# Patient Record
Sex: MALE | Race: WHITE
[De-identification: names, ages, dates, MRNs, and addresses within clinical notes are randomized per-mention and may not be internally consistent; named-entity substitution may affect disease eponyms.]

---

## 2017-05-07 ENCOUNTER — HOSPITAL ENCOUNTER (EMERGENCY)
Dept: HOSPITAL 25 - ED | Age: 42
Discharge: HOME | End: 2017-05-07
Payer: SELF-PAY

## 2017-05-07 VITALS — DIASTOLIC BLOOD PRESSURE: 73 MMHG | SYSTOLIC BLOOD PRESSURE: 120 MMHG
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
## 2017-05-07 DIAGNOSIS — S61.214A: Primary | ICD-10-CM
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
## 2017-05-07 PROCEDURE — 99282 EMERGENCY DEPT VISIT SF MDM: CPT

## 2017-05-07 NOTE — ED
Laceration/Wound HPI





- HPI Summary


HPI Summary: 





Patient is an otherwise healthy 40 YO BIBA with a laceration of finger. Patient 

was washing a wall down at work ([employer]) when he caught his right 4th 

finger on the corner of a piece of metal.  He denies other injuries or pain.  

He denies numbness, tingling, or color or temperature changes. Nail bed is 

intact. 





- History of Current Complaint


Stated Complaint: FINGER LAC


Time Seen by Provider: 05/07/17 20:55


Hx Obtained From: Patient


Mechanism of Injury: Sharp/Blunt Trauma


Onset/Duration: Sudden Onset


Aggravating: Nothing


Alleviating: Compression


Timing: Constant


Onset Severity: Moderate


Current Severity: Severe


Pain Intensity: 5


Pain Scale Used: 0-10 Numeric


Associated Signs & Symptoms: Negative


Related Hx: Occupational Injury, Dominant Hand (Right)





- Allergy/Home Medications


Allergies/Adverse Reactions: 


 Allergies











Allergy/AdvReac Type Severity Reaction Status Date / Time


 


No Known Allergies Allergy   Verified 05/07/17 21:55














PMH/Surg Hx/FS Hx/Imm Hx


Previously Healthy: Yes





- Immunization History


Hx Pertussis Vaccination: No


Immunizations Up to Date: Yes


Infectious Disease History: Yes


Infectious Disease History: 


   Denies: Traveled Outside the [place] in Last 30 Days





- Social History


Occupation: Employed Full-time


Lives: With Family


Alcohol Use: None


Hx Substance Use: No


Substance Use Type: Reports: None


Hx Tobacco Use: No


Smoking Status (MU): Never Smoked Tobacco


Do You Chew or Dip Tobacco: No


Have You Chewed or Dipped Tobacco in the LAST YEAR: No





Review of Systems


Constitutional: Negative


Eyes: Negative


Cardiovascular: Negative


Positive: Shortness Of Breath


Positive: no symptoms reported, see HPI


Positive: Other - .5cm avulsion to the fourth finger


Neurological: Negative


Psychological: Normal


All Other Systems Reviewed And Are Negative: Yes





Physical Exam


Triage Information Reviewed: Yes


Vital Signs On Initial Exam: 


 Initial Vitals











Temp Pulse Resp BP Pulse Ox


 


 99.3 F   70   20   120/73   98 


 


 05/07/17 19:42  05/07/17 19:42  05/07/17 19:42  05/07/17 19:42  05/07/17 19:42











Vital Signs Reviewed: Yes


Appearance: Positive: Well-Appearing, Well-Nourished


Skin: Positive: Warm, Skin Color Reflects Adequate Perfusion, Other - fourth 

right finger avulsion measuring .5cm in length


Head/Face: Positive: Normal Head/Face Inspection


Eyes: Positive: LORENA, Conjunctiva Clear


Neck: Positive: Supple, No Lymphadenopathy


Respiratory/Lung Sounds: Positive: Clear to Auscultation, Breath Sounds Present


Cardiovascular: Positive: RRR, Pulses are Symmetrical in both Upper and Lower 

Extremities


Musculoskeletal: Positive: Normal, Strength/ROM Intact


Neurological: Positive: Sensory/Motor Intact, Speech Normal


Psychiatric: Positive: Normal





Diagnostics





- Vital Signs


 Vital Signs











  Temp Pulse Resp BP Pulse Ox


 


 05/07/17 19:42  99.3 F  70  20  120/73  98














- Laboratory


Lab Statement: Any lab studies that have been ordered have been reviewed, and 

results considered in the medical decision making process.





Laceration Repair Course/Dx





- Course


Course Of Treatment: Timeout obtained for laceration repair with sutures.  

Patient not tolerating lidocaine well.  Attempted to provide sutures with the 

amount of lidocaine which was already placed and patient not tolerating 

procedure.  The decision was made between provider and patient, we would use 

steri strips and use a compression dressing for 5 days over the wound to keep 

it covered with the skin intact.  Patient will follow up with PCP.  Tetanus UTD.





- Differential Dx


Differental Diagnoses: Avulsion, Healing Wound, Laceration, Tenosynovitis





- Clinical Impression


Provider Diagnoses: 


 Avulsion, finger tip








Discharge





- Discharge Plan


Condition: Stable


Disposition: HOME


Patient Education Materials:  Steristrips (ED)


Referrals: 


No Primary Care Phys,NOPCP [Primary Care Provider] - 


Additional Instructions: 


Continue with steri strips and compression dressing for at least 5 days.  


If you notice any signs of infection, come back to ED

## 2018-05-07 ENCOUNTER — HOSPITAL ENCOUNTER (EMERGENCY)
Dept: HOSPITAL 25 - UCEAST | Age: 43
Discharge: HOME | End: 2018-05-07
Payer: SELF-PAY

## 2018-05-07 VITALS — SYSTOLIC BLOOD PRESSURE: 125 MMHG | DIASTOLIC BLOOD PRESSURE: 91 MMHG
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
## 2018-05-07 DIAGNOSIS — S06.0X0A: Primary | ICD-10-CM

## 2018-05-07 DIAGNOSIS — Z87.891: ICD-10-CM
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
## 2018-05-07 DIAGNOSIS — S01.81XA: ICD-10-CM

## 2018-05-07 PROCEDURE — 12011 RPR F/E/E/N/L/M 2.5 CM/<: CPT

## 2018-05-07 PROCEDURE — 70450 CT HEAD/BRAIN W/O DYE: CPT

## 2018-05-07 PROCEDURE — G0463 HOSPITAL OUTPT CLINIC VISIT: HCPCS

## 2018-05-07 PROCEDURE — 12001 RPR S/N/AX/GEN/TRNK 2.5CM/<: CPT

## 2018-05-07 PROCEDURE — 99202 OFFICE O/P NEW SF 15 MIN: CPT

## 2018-05-07 NOTE — RAD
INDICATION:  Laceration overlying the left frontal bone after traumatic injury



COMPARISON: None.



TECHNIQUE: Contiguous axial sections of the brain were obtained from the skull base to the

vertex without contrast.



FINDINGS: 



The ventricles, cisterns and sulci are within normal limits.  



    The gray-white matter differentiation is adequately maintained and there is no sulcal

effacement. No significant focal abnormality or mass effect is present. 



There is no evidence for intracranial hemorrhage.



There is a small amount of infiltration in the subcutaneous tissue overlying the left

frontal bone. Evidence of a small laceration is seen at this same location. There is no

underlying calvarial fracture.. 



There is mild mucosal thickening of the ethmoid air cells bilaterally.



The mastoid air cells are well aerated bilaterally.



IMPRESSION:  Mild infiltration in the subcutaneous tissue overlying the left frontal bone

without underlying calvarial fracture or acute intracranial hemorrhage.

## 2018-05-07 NOTE — UC
Head Injury HPI





- HPI Summary


HPI Summary: 





41 yo male presents soon after work related head injury


bumped head on dumpster


No LOC


bled profusely


nausea initially





HA 10/10


no photo phono phobia





no neck pain











- History Of Current Complaint


Chief Complaint: UCHeadInjury


Stated Complaint: HEAD LACERATION


Time Seen by Provider: 05/07/18 18:12


Hx Obtained From: Patient


Onset/Duration: Sudden Onset, Lasting Minutes


Severity Currently: Severe


Severity Initially: Severe


Pain Intensity: 10


Pain Scale Used: 0-10 Numeric


Character: Sharp, Throbbing, Pressure


Aggravating Factor(s): Nothing


Alleviating Factor(s): Nothing


Associated Signs And Symptoms: Positive: Nausea.  Negative: LOC (Time In Secs./

Mins/Hrs), LOC Duration Unknown, Confusion, Memory Loss, Seizure, Epistaxis, 

Dental Malocclusion, Neck Pain, Vomiting





- Allergies/Home Medications


Allergies/Adverse Reactions: 


 Allergies











Allergy/AdvReac Type Severity Reaction Status Date / Time


 


No Known Allergies Allergy   Verified 05/07/18 18:06














PMH/Surg Hx/FS Hx/Imm Hx


Previously Healthy: Yes





- Surgical History


Surgical History: Yes


Surgery Procedure, Year, and Place: appendix





- Family History


Known Family History: Positive: Hypertension





- Social History


Alcohol Use: None


Substance Use Type: None


Smoking Status (MU): Former Smoker





Review of Systems


Constitutional: Negative


Skin: Negative


Eyes: Negative


ENT: Negative


Respiratory: Negative


Cardiovascular: Negative


Gastrointestinal: Nausea - resolved


Genitourinary: Negative


Motor: Negative


Neurovascular: Negative


Musculoskeletal: Negative


Neurological: Headache


Psychological: Negative


Is Patient Immunocompromised?: No


All Other Systems Reviewed And Are Negative: Yes





Physical Exam


Triage Information Reviewed: Yes


Appearance: Well-Appearing, No Pain Distress, Well-Nourished


Vital Signs: 


 Initial Vital Signs











Temp  98.4 F   05/07/18 17:58


 


Pulse  66   05/07/18 17:58


 


Resp  18   05/07/18 17:58


 


BP  123/87   05/07/18 17:58


 


Pulse Ox  99   05/07/18 17:58











Vital Signs Reviewed: Yes


Eyes: Positive: Conjunctiva Clear, Other: - perrl/eomi


ENT: Positive: Hearing grossly normal, TMs normal, Uvula midline.  Negative: 

Nasal congestion, Nasal drainage, Tonsillar swelling, Tonsillar exudate, Trismus

, Muffled voice, Hoarse voice, Sinus tenderness


Neck: Positive: Supple, Nontender, No Lymphadenopathy


Respiratory: Positive: Lungs clear, Normal breath sounds, No respiratory 

distress, No accessory muscle use


Cardiovascular: Positive: RRR, No Murmur


Musculoskeletal: Positive: ROM Intact, No Edema


Neurological: Positive: Alert


Psychological Exam: Normal


Skin Exam: Other - see image





Procedures





- Laceration/Wound Repair


  ** 1


Location: head


Description: Linear


Length, Depth and Shape: 1.5 cm. ,3 mm, linear


Irrigated w/ Saline (ccs): 250


Laceration/Wound Explored: clean


Closure: Skin Adhesive, SteriStrips


Layer Closure?: No


Sterile Dressing Applied?: No





Diagnostics





- Radiology


  ** No standard instances


Xray Interpretation: No Acute Changes - CT brain


Radiology Interpretation Completed By: Radiologist





Head Injury Course/Dx





- Differential Dx/Diagnosis


Provider Diagnoses: concussion.  laceration forehead, skin adhesive repair





Discharge





- Sign-Out/Discharge


Documenting (check all that apply): Discharge/Admit/Transfer





- Discharge Plan


Condition: Stable


Disposition: HOME


Prescriptions: 


Ibuprofen TAB* [Motrin TAB*] 600 mg PO Q6H PRN #40 tab


 PRN Reason: Pain


Patient Education Materials:  Concussion (ED), Skin Adhesive Care (ED)


Forms:  *Work Release


Referrals: 


No Primary Care Phys,NOPCP [Primary Care Provider] - 


Additional Instructions: 


recheck here 5/10 for reevaluation








- Billing Disposition and Condition


Condition: STABLE


Disposition: HOME





Images


Head: 


  __________________________














  __________________________





 1 - 1.5 cm lac

## 2018-05-10 ENCOUNTER — HOSPITAL ENCOUNTER (EMERGENCY)
Dept: HOSPITAL 25 - UCEAST | Age: 43
Discharge: HOME | End: 2018-05-10
Payer: SELF-PAY

## 2018-05-10 VITALS — DIASTOLIC BLOOD PRESSURE: 77 MMHG | SYSTOLIC BLOOD PRESSURE: 114 MMHG

## 2018-05-10 DIAGNOSIS — R10.31: ICD-10-CM
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
## 2018-05-10 DIAGNOSIS — S09.90XD: Primary | ICD-10-CM

## 2018-05-10 DIAGNOSIS — Z87.891: ICD-10-CM

## 2018-05-10 PROCEDURE — 99211 OFF/OP EST MAY X REQ PHY/QHP: CPT

## 2018-05-10 PROCEDURE — G0463 HOSPITAL OUTPT CLINIC VISIT: HCPCS

## 2018-05-10 NOTE — ED
Head Injury





- HPI Summary


HPI Summary: 


42 male presents with head injury rechecked today.  He states on Monday he hit 

his head on a dumpster.  He denies any loss consciousness.  He has mild 

intermittent headache.  He denies any current headache.  He did have nausea but 

that resolved.  He denies any dizziness.  He denies any neck pain.  Denies any 

photophobia.  He denies any difficulties concentrating.  He denies any 

photophobia.  He denies any change in vision.  He denies any vomiting.  He 

states he feels he is ready to go back to work. 


He also run out that he has been having intermittent right lower quadrant pain 

for the past couple months.  He states he feels better after bowel movement.  

He states he has been burping a lot.  He is tried Gas-X with minimal relief.  

He states he has had normal appetite.  Denies any nausea vomiting.  Denies any 

fever.  He has had his appendix removed in the past.  He denies any pain with 

urination.  He denies any hematuria.








- History Of Current Complaint


Chief Complaint: UCHeadInjury


Stated Complaint: HEAD INJURY RECHECK


Time Seen by Provider: 05/10/18 10:43


Pain Intensity: 2





- Allergies/Home Medications


Allergies/Adverse Reactions: 


 Allergies











Allergy/AdvReac Type Severity Reaction Status Date / Time


 


No Known Allergies Allergy   Verified 05/10/18 10:38














PMH/Surg Hx/FS Hx/Imm Hx


Endocrine/Hematology History: 


   Denies: Hx Anticoagulant Therapy


Respiratory History: 


   Denies: Hx Asthma





- Surgical History


Surgery Procedure, Year, and Place: appendix


Infectious Disease History: No


Infectious Disease History: 


   Denies: Traveled Outside the [place] in Last 30 Days





- Family History


Known Family History: Positive: Hypertension





- Social History


Alcohol Use: None


Hx Substance Use: No


Substance Use Type: Reports: None


Hx Tobacco Use: No


Smoking Status (MU): Former Smoker





Review of Systems


Negative: Fever


Negative: Chest Pain


Negative: Shortness Of Breath


Positive: Abdominal Pain


Positive: Headache


All Other Systems Reviewed And Are Negative: Yes





Physical Exam


Triage Information Reviewed: Yes


Vital Signs On Initial Exam: 


 Initial Vitals











Temp Pulse Resp BP Pulse Ox


 


 97.9 F   70   16   114/77   98 


 


 05/10/18 10:38  05/10/18 10:38  05/10/18 10:38  05/10/18 10:38  05/10/18 10:38











Vital Signs Reviewed: Yes


Appearance: Positive: Well-Appearing


Skin: Positive: Warm, Dry


Head/Face: Positive: Normal Head/Face Inspection, Other - healing laceration to 

side of head left


Eyes: Positive: Normal, EOMI, LORENA, Conjunctiva Clear


ENT: Positive: Normal ENT inspection, Pharynx normal, TMs normal


Neck: Positive: Other: - nontender neck


Respiratory/Lung Sounds: Positive: Clear to Auscultation, Breath Sounds Present


Cardiovascular: Positive: Normal, RRR


Abdomen Description: Positive: Nontender, Soft


Bowel Sounds: Positive: Present


Musculoskeletal: Positive: Normal


Neurological: Positive: Sensory/Motor Intact, Alert, Oriented to Person Place, 

Time, CN Intact II-III, Normal Gait, Finger to Nose


Psychiatric: Positive: Normal





Diagnostics





- Vital Signs


 Vital Signs











  Temp Pulse Resp BP Pulse Ox


 


 05/10/18 10:38  97.9 F  70  16  114/77  98














- Laboratory


Lab Statement: Any lab studies that have been ordered have been reviewed, and 

results considered in the medical decision making process.





Head Injury Course/Dx


Course Of Treatment: 42 male presents with head injury rechecked today.  He 

states on Monday he hit his head on a dumpster.  He denies any loss 

consciousness.  He has mild intermittent headache.  He denies any current 

headache.  He did have nausea but that resolved.  He denies any dizziness.  He 

denies any neck pain.  Denies any photophobia.  He denies any difficulties 

concentrating.  He denies any photophobia.  He denies any change in vision.  He 

denies any vomiting.  He states he feels he is ready to go back to work.  He 

also run out that he has been having intermittent right lower quadrant pain for 

the past couple months.  He states he feels better after bowel movement.  He 

states he has been burping a lot.  He is tried Gas-X with minimal relief.  He 

states he has had normal appetite.  Denies any nausea vomiting.  Denies any 

fever.  He has had his appendix removed in the past.  He denies any pain with 

urination.  He denies any hematuria.  On exam normal neuro exam.  Nontender 

abdomen.  Will cleared for work with head injury.  With abdomen being nontender 

and not having any pain with urination do not suspect anything surgical or 

infectious at this time.  Told to try increase fiber as may be related to 

constipation.  told to try Pepcid.  told to establish care with primary to 

follow up about abdominal pain. told if anything changes to return to ED for 

further testing about abdominal pain.  Patient understands and agrees with plan.





- Diagnoses


Differential Diagnosis/HQI/PQRI: Concussion Without LOC, Contusion, 

Intracranial Bleed


Provider Diagnoses: 


 Head injury, Abdominal pain








Discharge





- Sign-Out/Discharge


Documenting (check all that apply): Discharge/Admit/Transfer





- Discharge Plan


Condition: Good


Disposition: HOME


Patient Education Materials:  Head Injury (ED)


Forms:  *Work Release


Referrals: 


[facility] [place] [facility]ERR[facility] [Outside]


Additional Instructions: 


Establish care with primary


Take Tylenol or ibuprofen every 6 hours for pain as needed


Return to ED if develop any new or worsening symptoms








- Billing Disposition and Condition


Condition: GOOD


Disposition: HOME

## 2018-08-05 ENCOUNTER — HOSPITAL ENCOUNTER (EMERGENCY)
Dept: HOSPITAL 25 - ED | Age: 43
Discharge: HOME | End: 2018-08-05
Payer: SELF-PAY

## 2018-08-05 VITALS — SYSTOLIC BLOOD PRESSURE: 113 MMHG | DIASTOLIC BLOOD PRESSURE: 80 MMHG

## 2018-08-05 DIAGNOSIS — R10.31: Primary | ICD-10-CM

## 2018-08-05 DIAGNOSIS — Z90.89: ICD-10-CM

## 2018-08-05 DIAGNOSIS — F17.200: ICD-10-CM

## 2018-08-05 LAB
BASOPHILS # BLD AUTO: 0.1 10^3/UL (ref 0–0.2)
EOSINOPHIL # BLD AUTO: 0.1 10^3/UL (ref 0–0.6)
HCT VFR BLD AUTO: 47 % (ref 42–52)
HGB BLD-MCNC: 16.5 G/DL (ref 14–18)
LYMPHOCYTES # BLD AUTO: 1.5 10^3/UL (ref 1–4.8)
MCH RBC QN AUTO: 31 PG (ref 27–31)
MCHC RBC AUTO-ENTMCNC: 35 G/DL (ref 31–36)
MCV RBC AUTO: 90 FL (ref 80–94)
MONOCYTES # BLD AUTO: 0.8 10^3/UL (ref 0–0.8)
NEUTROPHILS # BLD AUTO: 4.3 10^3/UL (ref 1.5–7.7)
NRBC # BLD AUTO: 0 10^3/UL
NRBC BLD QL AUTO: 0.1
PLATELET # BLD AUTO: 281 10^3/UL (ref 150–450)
RBC # BLD AUTO: 5.25 10^6/UL (ref 4–5.4)
WBC # BLD AUTO: 6.8 10^3/UL (ref 3.5–10.8)

## 2018-08-05 PROCEDURE — 74018 RADEX ABDOMEN 1 VIEW: CPT

## 2018-08-05 PROCEDURE — 83605 ASSAY OF LACTIC ACID: CPT

## 2018-08-05 PROCEDURE — 36415 COLL VENOUS BLD VENIPUNCTURE: CPT

## 2018-08-05 PROCEDURE — 99282 EMERGENCY DEPT VISIT SF MDM: CPT

## 2018-08-05 PROCEDURE — 85025 COMPLETE CBC W/AUTO DIFF WBC: CPT

## 2018-08-05 PROCEDURE — 81003 URINALYSIS AUTO W/O SCOPE: CPT

## 2018-08-05 PROCEDURE — 80053 COMPREHEN METABOLIC PANEL: CPT

## 2018-08-05 PROCEDURE — 86140 C-REACTIVE PROTEIN: CPT

## 2018-08-05 PROCEDURE — 83690 ASSAY OF LIPASE: CPT

## 2018-08-05 NOTE — ED
Abdominal Pain/Male





- HPI Summary


HPI Summary: 





This is ernie Salazar documenting for attending Arnoldo Camacho MD. 


This patient is a 43 year old M presenting to [facility] with a chief complaint of 

RLQ pain for about 1 month. Pt says he has a PSHx of appendectomy and that the 

pain is intermittent burning, and can get very extreme, and gets worse at work. 

Pt works as a  and does mild lifting. Pt sometimes burps a lot, and eats 

spicy foods often. Patient reports mild constipation, but drank a lot of coffee 

this morning to try and get himself to have a BM, and said he had a few and 

that the stool was not hard. Patient denies nausea or vomiting, hematuria, fever

, back pain, dysuria. Pt also denies smoking.








- History of Current Complaint


Chief Complaint: EDAbdPain


Stated Complaint: RT FLANK PAIN


Time Seen by Provider: 08/05/18 13:59


Hx Obtained From: Patient


Onset/Duration: Lasting Weeks, Still Present


Timing: Intermittent


Severity Initially: Moderate


Severity Currently: Moderate


Pain Intensity: 4 - says pain is intermittent


Pain Scale Used: 0-10 Numeric


Location: Discrete At: RLQ


Character: Burning


Aggravating Factor(s): Food


Associated Signs And Symptoms: Negative: Fever, Back Pain, Urinary Symptoms, 

Nausea, Vomiting





- Allergies/Home Medications


Allergies/Adverse Reactions: 


 Allergies











Allergy/AdvReac Type Severity Reaction Status Date / Time


 


No Known Allergies Allergy   Verified 08/05/18 12:38














PMH/Surg Hx/FS Hx/Imm Hx


Endocrine/Hematology History: 


   Denies: Hx Anticoagulant Therapy


Respiratory History: 


   Denies: Hx Asthma





- Surgical History


Surgery Procedure, Year, and Place: appendix


Infectious Disease History: No


Infectious Disease History: 


   Denies: Traveled Outside the [place] in Last 30 Days





- Family History


Known Family History: Positive: Hypertension


Family History: Mom is a smoker. Dad is healthy.





- Social History


Alcohol Use: Occasionally


Hx Substance Use: No


Substance Use Type: Reports: None


Hx Tobacco Use: No


Smoking Status (MU): Current Every Day Smoker





Review of Systems


Negative: Fever


Negative: Shortness Of Breath, Cough


Positive: Abdominal Pain - RLQ .  Negative: Vomiting, Nausea


Negative: dysuria, hematuria


Positive: Other - scar ABD RLQ


All Other Systems Reviewed And Are Negative: Yes





Physical Exam





- Summary


Physical Exam Summary: 


Appearance: Well appearing, no pain distress


Skin: surgical scar in RLQ 


Head/face: normal


Eyes: EOMI, LORENA


ENT: normal


Neck: supple, non-tender


Respiratory: CTA, breath sounds present


Cardiovascular: RRR, pulses symmetrical  


Abdomen: tenderness in the RLQ, soft, no palpable hernia 


Bowel: mild constipation


Musculoskeletal: normal, strength/ROM intact


Neuro: normal, sensory motor intact, A&Ox3





Triage Information Reviewed: Yes


Vital Signs On Initial Exam: 


 Initial Vitals











Temp Pulse Resp BP Pulse Ox


 


 98.7 F   69   15   117/75   98 


 


 08/05/18 12:36  08/05/18 12:36  08/05/18 12:36  08/05/18 12:36  08/05/18 12:36











Vital Signs Reviewed: Yes


Appearance: Positive: Well-Appearing, Pain Distress - mild


Skin: Positive: Warm, Skin Color Reflects Adequate Perfusion, Dry


Head/Face: Positive: Normal Head/Face Inspection


Respiratory/Lung Sounds: Positive: Clear to Auscultation, Breath Sounds Present


Cardiovascular: Positive: Normal, RRR, Pulses are Symmetrical in both Upper and 

Lower Extremities


Abdomen Description: Positive: Other: - RLQ tenderness


Bowel Sounds: Positive: Other - mild const


Musculoskeletal: Positive: Normal


Neurological: Positive: Normal, Sensory/Motor Intact, Alert, Oriented to Person 

Place, Time


Psychiatric: Positive: Normal





Diagnostics





- Vital Signs


 Vital Signs











  Temp Pulse Resp BP Pulse Ox


 


 08/05/18 12:36  98.7 F  69  15  117/75  98














- Laboratory


Lab Results: 


 Lab Results











  08/05/18 08/05/18 Range/Units





  14:01 14:08 


 


WBC  6.8   (3.5-10.8)  10^3/ul


 


RBC  5.25   (4.00-5.40)  10^6/ul


 


Hgb  16.5   (14.0-18.0)  g/dl


 


Hct  47   (42-52)  %


 


MCV  90   (80-94)  fL


 


MCH  31   (27-31)  pg


 


MCHC  35   (31-36)  g/dl


 


RDW  13   (10.5-15)  %


 


Plt Count  281   (150-450)  10^3/ul


 


MPV  7.7   (7.4-10.4)  um3


 


Neut % (Auto)  63.8   (38-83)  %


 


Lymph % (Auto)  22.7 L   (25-47)  %


 


Mono % (Auto)  11.2 H   (0-7)  %


 


Eos % (Auto)  1.2   (0-6)  %


 


Baso % (Auto)  1.1   (0-2)  %


 


Absolute Neuts (auto)  4.3   (1.5-7.7)  10^3/ul


 


Absolute Lymphs (auto)  1.5   (1.0-4.8)  10^3/ul


 


Absolute Monos (auto)  0.8   (0-0.8)  10^3/ul


 


Absolute Eos (auto)  0.1   (0-0.6)  10^3/ul


 


Absolute Basos (auto)  0.1   (0-0.2)  10^3/ul


 


Absolute Nucleated RBC  0   10^3/ul


 


Nucleated RBC %  0.1   


 


Urine Color   Yellow  


 


Urine Appearance   Clear  


 


Urine pH   6.0  (5-9)  


 


Ur Specific Gravity   1.006 L  (1.010-1.030)  


 


Urine Protein   Negative  (Negative)  


 


Urine Ketones   Negative  (Negative)  


 


Urine Blood   Negative  (Negative)  


 


Urine Nitrate   Negative  (Negative)  


 


Urine Bilirubin   Negative  (Negative)  


 


Urine Urobilinogen   Negative  (Negative)  


 


Ur Leukocyte Esterase   Negative  (Negative)  


 


Urine Glucose   Negative  (Negative)  











Result Diagrams: 


 08/05/18 14:01





 08/05/18 14:01


Lab Statement: Any lab studies that have been ordered have been reviewed, and 

results considered in the medical decision making process.





- Radiology


  ** Abd xray


Xray Interpretation: No Acute Changes


Radiology Interpretation Completed By: Radiologist - Unremarkable abdomen 

series. ED physician has reviewed this radiology report.





Abdominal Pain Fem Course/Dx





- Course


Course Of Treatment: Patient with ongoing intermittent abdominal pain for about 

one month.  His abdomen is soft and nontender.  There is no palpable hernia in 

the area of prior surgery.  X-ray shows positive bowel gas but no specific 

pathology.  Laboratories are all benign.  His possibly he's had symptoms due to 

constipation.  We will treat him presumptively for that.  He is given follow-up 

and should follow-up this week.





- Diagnoses


Differential Diagnosis/HQI/PQRI: Other - Inflammatory bowel disease, hernia, UTI

, constipation


Provider Diagnoses: 


 Right sided abdominal pain








Discharge





- Sign-Out/Discharge


Documenting (check all that apply): Patient Departure





- Discharge Plan


Condition: Improved


Disposition: HOME


Prescriptions: 


Hyoscyamine Sulfate [Levsin] 0.125 mg PO Q4H PRN #30 tablet


 PRN Reason: cramping


Polyethylene Glycol 3350* [Miralax*] 17 gm PO TID #1 bottle


Patient Education Materials:  Acute Abdominal Pain (ED)


Referrals: 


[facility] [Outside]


[facility] [place] [facility] [Outside]


Additional Instructions: 


Drink plenty of fluids, abdominal massage may help.  Stay active.  High-fiber 

diet.  Return if worse, fever, new symptoms or other concerns as discussed.





- Billing Disposition and Condition


Condition: IMPROVED


Disposition: Home

## 2019-06-19 ENCOUNTER — HOSPITAL ENCOUNTER (EMERGENCY)
Dept: HOSPITAL 25 - ED | Age: 44
Discharge: LEFT BEFORE DISCHARGE | End: 2019-06-19
Payer: COMMERCIAL

## 2019-06-19 VITALS — DIASTOLIC BLOOD PRESSURE: 83 MMHG | SYSTOLIC BLOOD PRESSURE: 140 MMHG

## 2019-06-19 DIAGNOSIS — M54.9: Primary | ICD-10-CM

## 2019-06-19 DIAGNOSIS — Z53.21: ICD-10-CM

## 2019-06-19 LAB
RBC UR QL AUTO: (no result)
WBC UR QL AUTO: (no result)

## 2019-06-19 PROCEDURE — 87086 URINE CULTURE/COLONY COUNT: CPT

## 2019-06-19 PROCEDURE — 81015 MICROSCOPIC EXAM OF URINE: CPT

## 2019-06-19 PROCEDURE — 81003 URINALYSIS AUTO W/O SCOPE: CPT

## 2019-06-22 ENCOUNTER — HOSPITAL ENCOUNTER (EMERGENCY)
Dept: HOSPITAL 25 - ED | Age: 44
Discharge: HOME | End: 2019-06-22
Payer: COMMERCIAL

## 2019-06-22 VITALS — SYSTOLIC BLOOD PRESSURE: 120 MMHG | DIASTOLIC BLOOD PRESSURE: 84 MMHG

## 2019-06-22 DIAGNOSIS — N20.9: Primary | ICD-10-CM

## 2019-06-22 DIAGNOSIS — F17.210: ICD-10-CM

## 2019-06-22 DIAGNOSIS — R91.1: ICD-10-CM

## 2019-06-22 LAB
ALBUMIN SERPL BCG-MCNC: 4.3 G/DL (ref 3.2–5.2)
ALBUMIN/GLOB SERPL: 1.8 {RATIO} (ref 1–3)
ALP SERPL-CCNC: 70 U/L (ref 34–104)
ALT SERPL W P-5'-P-CCNC: 65 U/L (ref 7–52)
ANION GAP SERPL CALC-SCNC: 6 MMOL/L (ref 2–11)
AST SERPL-CCNC: 30 U/L (ref 13–39)
BASOPHILS # BLD AUTO: 0 10^3/UL (ref 0–0.2)
BUN SERPL-MCNC: 22 MG/DL (ref 6–24)
BUN/CREAT SERPL: 20.2 (ref 8–20)
CALCIUM SERPL-MCNC: 9.2 MG/DL (ref 8.6–10.3)
CHLORIDE SERPL-SCNC: 105 MMOL/L (ref 101–111)
EOSINOPHIL # BLD AUTO: 0.2 10^3/UL (ref 0–0.6)
GLOBULIN SER CALC-MCNC: 2.4 G/DL (ref 2–4)
GLUCOSE SERPL-MCNC: 111 MG/DL (ref 70–100)
HCO3 SERPL-SCNC: 27 MMOL/L (ref 22–32)
HCT VFR BLD AUTO: 43 % (ref 42–52)
HGB BLD-MCNC: 15.3 G/DL (ref 14–18)
LYMPHOCYTES # BLD AUTO: 1.1 10^3/UL (ref 1–4.8)
MCH RBC QN AUTO: 32 PG (ref 27–31)
MCHC RBC AUTO-ENTMCNC: 35 G/DL (ref 31–36)
MCV RBC AUTO: 89 FL (ref 80–94)
MONOCYTES # BLD AUTO: 0.6 10^3/UL (ref 0–0.8)
NEUTROPHILS # BLD AUTO: 4.6 10^3/UL (ref 1.5–7.7)
NRBC # BLD AUTO: 0 10^3/UL
NRBC BLD QL AUTO: 0.1
PLATELET # BLD AUTO: 223 10^3/UL (ref 150–450)
POTASSIUM SERPL-SCNC: 3.7 MMOL/L (ref 3.5–5)
PROT SERPL-MCNC: 6.7 G/DL (ref 6.4–8.9)
RBC # BLD AUTO: 4.85 10^6 /UL (ref 4.18–5.48)
RBC UR QL AUTO: (no result)
SODIUM SERPL-SCNC: 138 MMOL/L (ref 135–145)
WBC # BLD AUTO: 6.4 10^3/UL (ref 3.5–10.8)
WBC UR QL AUTO: (no result)

## 2019-06-22 PROCEDURE — 87086 URINE CULTURE/COLONY COUNT: CPT

## 2019-06-22 PROCEDURE — 74176 CT ABD & PELVIS W/O CONTRAST: CPT

## 2019-06-22 PROCEDURE — 96374 THER/PROPH/DIAG INJ IV PUSH: CPT

## 2019-06-22 PROCEDURE — 96375 TX/PRO/DX INJ NEW DRUG ADDON: CPT

## 2019-06-22 PROCEDURE — 83605 ASSAY OF LACTIC ACID: CPT

## 2019-06-22 PROCEDURE — 36415 COLL VENOUS BLD VENIPUNCTURE: CPT

## 2019-06-22 PROCEDURE — 85025 COMPLETE CBC W/AUTO DIFF WBC: CPT

## 2019-06-22 PROCEDURE — 96361 HYDRATE IV INFUSION ADD-ON: CPT

## 2019-06-22 PROCEDURE — 81015 MICROSCOPIC EXAM OF URINE: CPT

## 2019-06-22 PROCEDURE — 86140 C-REACTIVE PROTEIN: CPT

## 2019-06-22 PROCEDURE — 80053 COMPREHEN METABOLIC PANEL: CPT

## 2019-06-22 PROCEDURE — 83690 ASSAY OF LIPASE: CPT

## 2019-06-22 PROCEDURE — 99282 EMERGENCY DEPT VISIT SF MDM: CPT

## 2019-06-22 PROCEDURE — 81003 URINALYSIS AUTO W/O SCOPE: CPT

## 2019-06-22 NOTE — ED
Abdominal Pain/Male





- HPI Summary


HPI Summary: 


Pt. is a 43 y.o male who presents to the ER for left flank pain x 3 days. Pt. 

states pain wraps around to LLQ. Pt. checked into ER yesterday but left due to 

wait time. Pt. states pain resolved and then started again today. Associated 

sxs of nausea. Denies fever, CP, SOB, urinary sxs. No past medical hx. Pt. 

denies injury. Sxs are moderate in severity. No current modifying factors. 








- History of Current Complaint


Chief Complaint: EDFlankPain


Stated Complaint: L CHEST/BACK PAIN SOB PER PT


Time Seen by Provider: 06/22/19 06:03


Hx Obtained From: Patient


Pain Intensity: 10





- Allergies/Home Medications


Allergies/Adverse Reactions: 


 Allergies











Allergy/AdvReac Type Severity Reaction Status Date / Time


 


No Known Allergies Allergy   Verified 08/05/18 12:38














PMH/Surg Hx/FS Hx/Imm Hx


Previously Healthy: Yes


Endocrine/Hematology History: 


   Denies: Hx Anticoagulant Therapy


Respiratory History: 


   Denies: Hx Asthma





- Surgical History


Surgery Procedure, Year, and Place: appendix


Infectious Disease History: No


Infectious Disease History: 


   Denies: Traveled Outside the [place] in Last 30 Days





- Family History


Known Family History: Positive: Hypertension, Non-Contributory


Family History: Mom is a smoker. Dad is healthy.





- Social History


Occupation: Employed Full-time


Lives: With Family


Alcohol Use: Occasionally


Hx Substance Use: No


Substance Use Type: Reports: None


Hx Tobacco Use: No


Smoking Status (MU): Current Every Day Smoker





Review of Systems


Constitutional: Negative


Negative: Fever, Chills


Cardiovascular: Negative


Negative: Palpitations, Chest Pain


Respiratory: Negative


Negative: Shortness Of Breath, Cough


Positive: Abdominal Pain, Nausea


Positive: flank pain.  Negative: dysuria, hematuria


Musculoskeletal: Negative


Skin: Negative


Neurological: Negative


All Other Systems Reviewed And Are Negative: Yes





Physical Exam


Triage Information Reviewed: Yes


Vital Signs On Initial Exam: 


 Initial Vitals











Temp Pulse Resp BP Pulse Ox


 


 98.5 F   62   16   120/86   99 


 


 06/22/19 05:48  06/22/19 05:48  06/22/19 05:48  06/22/19 05:48  06/22/19 05:48











Vital Signs Reviewed: Yes


Appearance: Positive: Pain Distress - Pt. appears in pain but nontoxic. Wife 

present.


Skin: Positive: Warm, Dry


Head/Face: Positive: Normal Head/Face Inspection


Eyes: Positive: Normal, EOMI, LORENA


Neck: Positive: Supple


Respiratory/Lung Sounds: Positive: Clear to Auscultation, Breath Sounds 

Present.  Negative: Rales, Rhonchi, Wheezes


Cardiovascular: Positive: Normal, RRR


Abdomen Description: Positive: Other: - Diffuse abd. tenderness with guarding. 

Left CVA tenderness.


Musculoskeletal: Positive: Normal, Strength/ROM Intact


Neurological: Positive: Normal, CN Intact II-III


Psychiatric: Positive: Affect/Mood Appropriate





Diagnostics





- Vital Signs


 Vital Signs











  Temp Pulse Resp BP Pulse Ox


 


 06/22/19 05:48  98.5 F  62  16  120/86  99














- Laboratory


Result Diagrams: 


 06/22/19 06:26





 06/22/19 06:26


Lab Statement: Any lab studies that have been ordered have been reviewed, and 

results considered in the medical decision making process.





Abdominal Pain Male Course/Dx





- Course


Course Of Treatment: Pt. presenting with left flank pain. Suspect urolithiasis. 

Afebrile. Pt. started on IV fluids, toradol, morphine, and zofran. Labs and CT 

scan ordered.  Labs are unremarkable. U/A shows RBCs without signs of 

infection.  CT scan per radiology: IMPRESSION:  1. A 4 mm left distal ureteral 

stone with hydronephrosis and hydroureter.  2. 2 mm pulmonary nodule in right 

lower lobe, image 1 series 2.  For patients at low risk (minimal or absent 

history of smoking and of other.  known risk factors), no routine follow-up is 

indicated. For patients at high.  risk (history of smoking or of other known 

risk factors), consider optional CT.  at 12 months. (Riley et al., 

Fleischner Society, 2017).  3. Chronic compression fracture vs degenerative 

changes of T12.  Pain initially improved with toradol and morphine but returned 

and pt. vomiting into trash can. 1mg dilaudid and zofran ordered.  0925: Pt. re-

examined and is resting comfortably. Pain and vomiting controlled. Tolerating 

POs without difficulty.  reviewed and no red flags noted. Will rx percocet, 

zofran, and flomax. Discussed incidental finding of lung nodule. Pt. notes 

intermittent smoking. Will have him f.u with PCP for further evaluation. To 

increase fluids and strain urine. Advised to take OTC stool softener. Will 

return to ER for increased pain, vomiting, fever, or if concerned. Pt. and wife 

understand and agree with plan.





- Diagnoses


Differential Diagnosis/HQI/PQRI: Renal Colic, Testicular Torsion, Ureteral Stone

, Urinary Tract Infection


Provider Diagnoses: 


 Urolithiasis, Pulmonary nodule








Discharge





- Sign-Out/Discharge


Documenting (check all that apply): Patient Departure


Patient Received Moderate/Deep Sedation with Procedure: No





- Discharge Plan


Condition: Improved


Disposition: HOME


Prescriptions: 


Ondansetron TAB* [Zofran 4 MG Tab*] 4 mg PO Q6H PRN #12 tab


 PRN Reason: Nausea


oxyCODONE/Acetamin 5/325 MG* [Percocet 5/325 TAB*] 1 tab PO Q6H PRN #16 tab MDD 

4


 PRN Reason: Pain


Tamsulosin CAP* [Flomax CAP*] 0.4 mg PO DAILY #5 cap


Patient Education Materials:  Kidney Stones (ED), Pulmonary Nodules (ED)


Forms:  *Work Release


Referrals: 


Rodo Frey MD [Medical Doctor] - 


Ameya Celestin DO [Primary Care Provider] - 


Additional Instructions: 


Schedule a follow up appointment with PCP to further discussed pulmonary nodule


Follow up with urology if pain persist


Increase fluids


Medication as directed


Can rotate 600mg ibuprofen in between percocet as directed


Strain urine


Recommend taking over the counter stool softener 


Avoid smoking


Return to ER for uncontrollable pain, vomiting, fever, or if concerned





- Billing Disposition and Condition


Condition: IMPROVED


Disposition: Home

## 2019-07-18 ENCOUNTER — HOSPITAL ENCOUNTER (EMERGENCY)
Dept: HOSPITAL 25 - UCEAST | Age: 44
Discharge: HOME | End: 2019-07-18
Payer: COMMERCIAL

## 2019-07-18 VITALS — DIASTOLIC BLOOD PRESSURE: 71 MMHG | SYSTOLIC BLOOD PRESSURE: 107 MMHG

## 2019-07-18 DIAGNOSIS — Z53.8: Primary | ICD-10-CM

## 2019-07-18 PROCEDURE — G0463 HOSPITAL OUTPT CLINIC VISIT: HCPCS

## 2019-07-18 PROCEDURE — 99211 OFF/OP EST MAY X REQ PHY/QHP: CPT

## 2019-08-29 ENCOUNTER — HOSPITAL ENCOUNTER (EMERGENCY)
Dept: HOSPITAL 25 - ED | Age: 44
Discharge: HOME | End: 2019-08-29
Payer: COMMERCIAL

## 2019-08-29 VITALS — SYSTOLIC BLOOD PRESSURE: 123 MMHG | DIASTOLIC BLOOD PRESSURE: 78 MMHG

## 2019-08-29 DIAGNOSIS — K40.90: Primary | ICD-10-CM

## 2019-08-29 DIAGNOSIS — Z87.891: ICD-10-CM

## 2019-08-29 LAB — VIT C UR QL: (no result)

## 2019-08-29 PROCEDURE — 99283 EMERGENCY DEPT VISIT LOW MDM: CPT

## 2019-08-29 PROCEDURE — 81003 URINALYSIS AUTO W/O SCOPE: CPT

## 2019-08-29 NOTE — ED
GI/ HPI





- HPI Summary


HPI Summary: 


Patient is a 45 y/o M presenting to ED with complaints of a left-sided groin 

lump with pain. N/V and swelling of testicles are denied. He denies radiation 

of pain. Pain is noted to be aggravated by exertion and bowel movements. Sx 

have been present for the past week. He notes Hx of kidney stones, but states 

that present Sx are dissimilar. No PMHx, no daily medications reported, he 

denies tobacco and alcohol usage. Patient states that he is heavily active for 

work. He notes that he recently started his current job and states that his 

previous job was not as physically intense. On triage, pain is rated 7/10, 

heavy lifting is noted to aggravate Sx, patient notes that he took Tylenol 8/28/ 19. Home medications and allergies are reviewed. 





- History of Current Complaint


Chief Complaint: EDGeneral


Time Seen by Provider: 08/29/19 22:09


Stated Complaint: I THINK PULLED SOMETHING IN MY GROIN/ ABD PER PT


Hx Obtained From: Patient


Onset/Duration: Started Weeks Ago, Still Present


Timing: Constant, Lasting Weeks


Current Severity: Severe


Pain Intensity: 7


Location of Pain: Groin - left sided


Pain Characteristics: Other: - lump


Associated Signs and Symptoms: Positive: Other: - negative - testicular swelling

; positive - left-sided groin lump with pain.  Negative: Nausea, Vomiting


Aggravating Factor(s): Bowel Movement, Walking/Exertion


Alleviating Factor(s): Nothing





- Allergy/Home Medications


Allergies/Adverse Reactions: 


 Allergies











Allergy/AdvReac Type Severity Reaction Status Date / Time


 


No Known Allergies Allergy   Verified 07/18/19 15:16














PMH/Surg Hx/FS Hx/Imm Hx


Endocrine/Hematology History: 


   Denies: Hx Anticoagulant Therapy


Respiratory History: 


   Denies: Hx Asthma


 History: Reports: Hx Kidney Stones





- Surgical History


Surgery Procedure, Year, and Place: appendix


Infectious Disease History: No


Infectious Disease History: 


   Denies: Traveled Outside the [place] in Last 30 Days





- Family History


Known Family History: Positive: Hypertension


Family History: Mom is a smoker. Dad is healthy.





- Social History


Alcohol Use: None


Hx Substance Use: No


Substance Use Type: Reports: None


Hx Tobacco Use: No


Smoking Status (MU): Former Smoker





Review of Systems


Negative: Vomiting, Nausea


Genitourinary: Other - negative - testicular swelling 


Positive: pain - positive - left groin lump with pain 


All Other Systems Reviewed And Are Negative: Yes





Physical Exam





- Summary


Physical Exam Summary: 


Appearance: Well-appearing, Well-nourished, lying in bed comfortably


Skin: Warm, dry, no obvious rash


Eyes: sclera anicteric, no conjunctival pallor


ENT: mucous membranes moist, pharynx appears normal


Neck: Supple, nontender


Respiratory: Clear to auscultation, no signs of respiratory distress


Cardiovascular: Normal S1, S2. No murmurs. Normal distal pulses in tibial and 

radial bilaterally.


Abdomen: Soft, nontender, normal active bowel sounds present


: left-sided easily reducible inguinal hernia that is easily reproducible and 

not incarcerated or strangulated. 


Musculoskeletal: Normal, Strength/ROM Intact


Neurological: A&Ox3, awake and alert, mentation is normal, speech is fluent and 

appropriate


Psychiatric: affect is normal, does not appear anxious or depressed








Triage Information Reviewed: Yes


Vital Signs On Initial Exam: 


 Initial Vitals











Temp Pulse Resp BP Pulse Ox


 


 99 F   72   20   146/83   97 


 


 08/29/19 19:55  08/29/19 19:55  08/29/19 19:55  08/29/19 19:55  08/29/19 19:55











Vital Signs Reviewed: Yes





Diagnostics





- Vital Signs


 Vital Signs











  Temp Pulse Resp BP Pulse Ox


 


 08/29/19 21:55   60    97


 


 08/29/19 19:55  99 F  72  20  146/83  97














- Laboratory


Lab Results: 


 Lab Results











  08/29/19 Range/Units





  21:50 


 


Urine Color  Yellow  


 


Urine Appearance  Clear  


 


Urine pH  8.0  (5-9)  


 


Ur Specific Gravity  1.028  (1.010-1.030)  


 


Urine Protein  Negative  (Negative)  


 


Urine Ketones  Negative  (Negative)  


 


Urine Blood  Negative  (Negative)  


 


Urine Nitrate  Negative  (Negative)  


 


Urine Bilirubin  Negative  (Negative)  


 


Urine Urobilinogen  Negative  (Negative)  


 


Ur Leukocyte Esterase  Negative  (Negative)  


 


Urine Glucose  Negative  (Negative)  


 


Urine Ascorbic Acid  * A  (Negative)  











Lab Statement: Any lab studies that have been ordered have been reviewed, and 

results considered in the medical decision making process.





GIGU Course/Dx





- Course


Course Of Treatment: Patient is a 45 y/o M presenting to ED with complaints of 

a left-sided groin lump with pain. N/V and swelling of testicles are denied. He 

denies radiation of pain. Pain is noted to be aggravated by exertion and bowel 

movements. Sx have been present for the past week. Patient has a left-sided 

easily reducible inguinal hernia that is easily reproducible and not 

incarcerated or strangulated. UA showed ascorbic acid present. Patient was 

discharged to home and will follow up with surgery. He was advised to avoid 

exertion. Patient understands and agrees with this plan.





- Diagnoses


Provider Diagnoses: 


 Left inguinal hernia








Discharge ED





- Sign-Out/Discharge


Documenting (check all that apply): Patient Departure - discharge


Patient Received Moderate/Deep Sedation with Procedure: No





- Discharge Plan


Condition: Stable


Disposition: HOME


Patient Education Materials:  Inguinal Hernia (ED)


Forms:  *Work Release


Referrals: 


Lexus Robledo MD [Medical Doctor] - 


Ameya Celestin DO [Primary Care Provider] - 


Additional Instructions: 


It looks like you have a left sided inguinal hernia. It is not strangulated or 

incarcerated right now, so it does not have to be repaired emergently. However 

it will not heal itself, you really should see a surgeon to plan for a surgical 

repair.





- Billing Disposition and Condition


Condition: STABLE


Disposition: Home





- Attestation Statements


Document Initiated by Faustina: Yes


Documenting Scribe: NADEEM OROZCO


Provider For Whom Faustina is Documenting (Include Credential): ONELIA PEÑA MD


Scribe Attestation: 


I, NADEEM OROZCO, scribed for ONELIA PEÑA MD on 08/30/19 at 0307. 


Scribe Documentation Reviewed: Yes


Provider Attestation: 


The documentation as recorded by the NADEEM klein accurately reflects 

the service I personally performed and the decisions made by me, ONELIA PEÑA MD


Status of Scribe Document: Viewed

## 2019-10-18 NOTE — HP
CC:  Dr. Marciano Castle; Dr. Ameya Celestin*

 

DATE OF ADMISSION:  10/21/2019.

 

This patient is scheduled for Same Day Surgery admission by Dr. Castle.

 

DATE OF PREOPERATIVE HISTORY AND PHYSICAL EXAMINATION:  Friday, 10/18/2019.

 

ATTENDING PHYSICIAN:  Dr. Marciano Castle* (dictated by Monique Pires NP).

 

CHIEF COMPLAINT:  Left groin hernia.

 

HISTORY OF PRESENT ILLNESS:  The patient is a 44-year-old who was lifting and 
shoveling on August 29th at work when he felt a pop and significant pain in the 
left groin.  He stated that he was nauseated and vomited.  He went to urgent 
care and was found to have a fat containing left inguinal hernia on CAT scan 
and was referred for surgical consultation.  He reports persistent left groin 
discomfort which is worse while standing.  He is currently out of work.  He 
reports straining at stool, but denies chronic constipation.  He denies any 
dysuria.  He states this is a Workers' Comp case and the surgery has been 
approved.  Dr. Castle examined the patient and noted a reducible left inguinal 
hernia; no right inguinal hernia was noted.  Dr. Castle reviewed the findings 
with the patient and discussed various methods of repair, and the patient 
agreed to proceed with robotic repair of a left inguinal hernia with mesh as a 
same day surgery procedure under general anesthesia. Dr. Castle discussed the 
nature of this surgical procedure, the rationale for the procedure, the 
relevant risks and benefits and today I reviewed the expected postoperative 
care and recovery.  The patient has had a chance to ask questions and stated 
that he understands the information and is satisfied with the answers given to 
his questions.  He will sign surgical consent on the day of surgery.

 

PAST MEDICAL HISTORY:  Generally healthy.

 

PAST SURGICAL HISTORY:  Open appendectomy many years ago.

 

MEDICATIONS:  None currently.

 

ALLERGIES:  No known drug allergies.

 

FAMILY HISTORY:  No known anesthesia complications, bleeding tendencies, or 
clotting disorder.

 

SOCIAL HISTORY:  He is  and currently is out of work due to the Workers' 
Comp case.  He is a former smoker.  He denies use of alcohol or other 
substances.

 

REVIEW OF SYSTEMS:  Constitutional:  No fevers, chills, excessive fatigue or 
weight loss.  Endocrine:  No diabetes or thyroid disease.  Hematologic:  No 
easy bruising or bleeding.  No history of blood transfusions.  Respiratory:  No 
dyspnea on exertion.  No chronic cough. Cardiovascular:  No anginal chest pain 
or palpitations.  Gastrointestinal:  No recent nausea, vomiting, diarrhea, GI 
bleeding or constipation.  No heartburn.  Genitourinary:  No dysuria.  
Musculoskeletal: Normal strength and tone.  Integumentary:  No chronic rashes 
or skin changes. Neurologic:  No headache, blurred vision, areas of focal 
weakness or numbness. Psychiatric:  No insomnia or depression.  He is anxious 
about the upcoming surgery. General:  No previous anesthesia complications, no 
history of deep vein thrombosis or pulmonary embolism.  No bleeding tendencies.

 

                               PHYSICAL EXAMINATION

 

GENERAL:  The patient is a 44-year-old male, well-developed, well-nourished, in 
no acute distress.

 

VITAL SIGNS:  Height 67 inches, weight 155 pounds, body mass index 23.5.  Blood 
pressure 110/84, pulse 72 and regular, respiratory rate 16, temperature 97.3 
tympanic.

 

SKIN:  Warm, dry, intact.

 

HEENT:  Benign.

 

NECK:  Supple.  No cervical lymphadenopathy.

 

LUNGS:  Breath sounds bilaterally clear and equal.

 

HEART:  Regular rate and rhythm.  No murmurs or rubs appreciated.

 

ABDOMEN:  Active bowel sounds.  Soft, nondistended, nontender throughout.  No 
obvious masses or organomegaly.  Inguinal exam by Dr. Castle revealed a 
reducible left inguinal hernia.  No right inguinal hernia.  Normal external 
genitalia.

 

EXTREMITIES:  Warm without edema or skin ulceration.

 

RECTAL:  Exam deferred.

 

NEUROLOGIC:  Alert and oriented times three, steady gait.

 

IMPRESSION:  Left inguinal hernia.

 

PLAN:  Same day surgery admission to Dr. Castle's service on Monday, 10/21/2019 
for robotic left inguinal hernia repair with mesh.

 

 ____________________________________ 

URSULA PIRES, MARYCRUZ

 

[id number]/CPS #: [id number]

REGGIE

## 2019-10-21 ENCOUNTER — HOSPITAL ENCOUNTER (OUTPATIENT)
Dept: HOSPITAL 25 - OR | Age: 44
Discharge: HOME | End: 2019-10-21
Attending: SURGERY
Payer: COMMERCIAL

## 2019-10-21 VITALS — DIASTOLIC BLOOD PRESSURE: 96 MMHG | SYSTOLIC BLOOD PRESSURE: 134 MMHG

## 2019-10-21 DIAGNOSIS — Z87.891: ICD-10-CM

## 2019-10-21 DIAGNOSIS — K40.90: Primary | ICD-10-CM

## 2019-10-21 PROCEDURE — 49650 LAP ING HERNIA REPAIR INIT: CPT

## 2019-10-21 PROCEDURE — C1781 MESH (IMPLANTABLE): HCPCS

## 2019-10-21 RX ADMIN — FENTANYL CITRATE PRN MCG: 0.05 INJECTION, SOLUTION INTRAMUSCULAR; INTRAVENOUS at 13:01

## 2019-10-21 RX ADMIN — FENTANYL CITRATE PRN MCG: 0.05 INJECTION, SOLUTION INTRAMUSCULAR; INTRAVENOUS at 13:27

## 2019-10-21 RX ADMIN — FENTANYL CITRATE PRN MCG: 0.05 INJECTION, SOLUTION INTRAMUSCULAR; INTRAVENOUS at 12:56

## 2019-11-01 ENCOUNTER — HOSPITAL ENCOUNTER (EMERGENCY)
Dept: HOSPITAL 25 - ED | Age: 44
Discharge: HOME | End: 2019-11-01
Payer: COMMERCIAL

## 2019-11-01 VITALS — SYSTOLIC BLOOD PRESSURE: 111 MMHG | DIASTOLIC BLOOD PRESSURE: 81 MMHG

## 2019-11-01 DIAGNOSIS — Z87.891: ICD-10-CM

## 2019-11-01 DIAGNOSIS — N45.1: Primary | ICD-10-CM

## 2019-11-01 LAB
ALBUMIN SERPL BCG-MCNC: 4.4 G/DL (ref 3.2–5.2)
ALBUMIN/GLOB SERPL: 1.6 {RATIO} (ref 1–3)
ALP SERPL-CCNC: 77 U/L (ref 34–104)
ALT SERPL W P-5'-P-CCNC: 28 U/L (ref 7–52)
ANION GAP SERPL CALC-SCNC: 9 MMOL/L (ref 2–11)
AST SERPL-CCNC: 14 U/L (ref 13–39)
BASOPHILS # BLD AUTO: 0.1 10^3/UL (ref 0–0.2)
BUN SERPL-MCNC: 14 MG/DL (ref 6–24)
BUN/CREAT SERPL: 12.6 (ref 8–20)
CALCIUM SERPL-MCNC: 9.7 MG/DL (ref 8.6–10.3)
CHLORIDE SERPL-SCNC: 98 MMOL/L (ref 101–111)
EOSINOPHIL # BLD AUTO: 0 10^3/UL (ref 0–0.6)
GLOBULIN SER CALC-MCNC: 2.7 G/DL (ref 2–4)
GLUCOSE SERPL-MCNC: 121 MG/DL (ref 70–100)
HCO3 SERPL-SCNC: 31 MMOL/L (ref 22–32)
HCT VFR BLD AUTO: 43 % (ref 42–52)
HGB BLD-MCNC: 14.9 G/DL (ref 14–18)
LYMPHOCYTES # BLD AUTO: 1 10^3/UL (ref 1–4.8)
MCH RBC QN AUTO: 31 PG (ref 27–31)
MCHC RBC AUTO-ENTMCNC: 35 G/DL (ref 31–36)
MCV RBC AUTO: 89 FL (ref 80–94)
MONOCYTES # BLD AUTO: 2.2 10^3/UL (ref 0–0.8)
NEUTROPHILS # BLD AUTO: 17.7 10^3/UL (ref 1.5–7.7)
NRBC # BLD AUTO: 0 10^3/UL
NRBC BLD QL AUTO: 0
PLATELET # BLD AUTO: 281 10^3/UL (ref 150–450)
POTASSIUM SERPL-SCNC: 3.8 MMOL/L (ref 3.5–5)
PROT SERPL-MCNC: 7.1 G/DL (ref 6.4–8.9)
RBC # BLD AUTO: 4.84 10^6 /UL (ref 4.18–5.48)
SODIUM SERPL-SCNC: 138 MMOL/L (ref 135–145)
WBC # BLD AUTO: 21 10^3/UL (ref 3.5–10.8)

## 2019-11-01 PROCEDURE — 36415 COLL VENOUS BLD VENIPUNCTURE: CPT

## 2019-11-01 PROCEDURE — 76870 US EXAM SCROTUM: CPT

## 2019-11-01 PROCEDURE — 99283 EMERGENCY DEPT VISIT LOW MDM: CPT

## 2019-11-01 PROCEDURE — 83605 ASSAY OF LACTIC ACID: CPT

## 2019-11-01 PROCEDURE — 96376 TX/PRO/DX INJ SAME DRUG ADON: CPT

## 2019-11-01 PROCEDURE — 96361 HYDRATE IV INFUSION ADD-ON: CPT

## 2019-11-01 PROCEDURE — 74177 CT ABD & PELVIS W/CONTRAST: CPT

## 2019-11-01 PROCEDURE — 96374 THER/PROPH/DIAG INJ IV PUSH: CPT

## 2019-11-01 PROCEDURE — 83690 ASSAY OF LIPASE: CPT

## 2019-11-01 PROCEDURE — 85025 COMPLETE CBC W/AUTO DIFF WBC: CPT

## 2019-11-01 PROCEDURE — 80053 COMPREHEN METABOLIC PANEL: CPT

## 2019-11-01 NOTE — XMS REPORT
Continuity of Care Document (CCD)

 Created on:2019



Patient:René Greenfield

Sex:Male

:1975

External Reference #:[id number]





Demographics







 Address  [address] [place] [address]

 

 Mobile Phone  [phone]

 

 Email Address  [email or web address]

 

 Preferred Language  en

 

 Marital Status  Not  or 

 

 Tenriism Affiliation  Unknown

 

 Race  White

 

 Ethnic Group  Not  or 









Author







 Name  Marciano Castle MD (transmitted by agent of provider Yanet Khan)

 

 Address  [address] [place] [id number]









Care Team Providers







 Name  Role  Phone

 

 Ameya Celestin D.O. - Family Medicine  Care Team Information   [phone]
[phone]









Problems







 Description

 

 No Information Available







Social History







 Type  Date  Description  Comments

 

 Birth Sex    Unknown  

 

 Tobacco Use  Start: Unknown End:  Patient is a former smoker  



   Unknown    

 

 Smoking Status  Reviewed: 10/30/19  Patient is a former smoker  

 

 Exercise Type/Frequency    Does not exercise  







Allergies, Adverse Reactions, Alerts







 Description

 

 No Known Drug Allergies







Medications







 Active Medications  SIG  Qnty  Indications  Ordering Provider  Date

 

 Ibuprofen 200  600mg po q6h  60tabs    Monique B.  10/18/2019



            200mg  prn pain      Eckenrode, NP  



 Tablets          



           









 History Medications









 Oxycodone-Acetaminophen  1 tab by  10tabs    Monique B.  10/18/2019 -



              5-325mg Tablets  mouth every 4      Eckenrode, NP  Unknown



   hours as        



   needed        

 

 No Active Medications        Unknown  09/10/2019 -



           10/18/2019







Immunizations







 Description

 

 No Information Available







Vital Signs







 Date  Vital  Result  Comment

 

 10/30/2019  9:54am  Heart Rate  68 /min  









 BP Systolic Sitting  112 mmHg  

 

 BP Diastolic Sitting  78 mmHg  

 

 Respiratory Rate  18 /min  

 

 Body Temperature  98.3 F  









 10/18/2019  1:45pm  Height  67 inches  5'7"









 Weight  150.00 lb  

 

 Heart Rate  72 /min  

 

 BP Systolic Sitting  110 mmHg  

 

 BP Diastolic Sitting  84 mmHg  

 

 Respiratory Rate  16 /min  

 

 Body Temperature  97.3 F  

 

 BMI (Body Mass Index)  23.5 kg/m2  







Results







 Description

 

 No Information Available







Procedures







 Date  Code  Description  Status

 

 10/21/2019  19817  Laparoscopy, Surgical Repair Initial Inguinal Hernia  
Completed







Medical Devices







 Description

 

 No Information Available







Encounters







 Type  Date  Location  Provider  Dx  Diagnosis

 

 Office Visit  09/10/2019  [facility]  Marciano KILLIAN  K40.90  Unil 
inguinal



   10:45a  [facility]  MD Tin    hernia, w/o obst



           or gangr, not



           spcf as recur







Assessments







 Date  Code  Description  Provider

 

 10/21/2019  K40.90  Unilateral inguinal hernia, without  Marciano Castle MD



     obstruction or gangrene, not specified  



     as recurrent  

 

 10/18/2019  K40.90  Unilateral inguinal hernia, without  Monique Powell NP



     obstruction or gangrene, not specified  



     as recurrent  

 

 10/18/2019  Z01.818  Encounter for other preprocedural  Monique Powell NP



     examination  

 

 10/16/2019  K40.90  Unilateral inguinal hernia, without  Marciano Castle MD



     obstruction or gangrene, not specified  



     as recurrent  

 

 10/08/2019  K40.90  Unilateral inguinal hernia, without  Marciano Castle MD



     obstruction or gangrene, not specified  



     as recurrent  

 

 09/10/2019  K40.90  Unilateral inguinal hernia, without  Marciano Castle MD



     obstruction or gangrene, not specified  



     as recurrent  







Plan of Treatment

Future Appointment(s):2019  1:00 pm - Marciano Castle MD at [facility]/ - Monique Powell NPK40.90 Unilateral 
inguinal hernia, without obstruction or gangrene, not specified as 
recurrentFollow up:ibrnms19/30/19Z01.818 Encounter for other preprocedural 
examination



Functional Status







 Description

 

 No Information Available







Mental Status







 Description

 

 No Information Available







Referrals







 Description

 

 No Information Available

## 2019-11-01 NOTE — XMS REPORT
Continuity of Care Document (CCD)

 Created on:2019



Patient:René Greenfield

Sex:Male

:1975

External Reference #:[id number]





Demographics







 Address  [address] [place] [address]

 

 Mobile Phone  [phone]

 

 Email Address  [email or web address]

 

 Preferred Language  en

 

 Marital Status  Not  or 

 

 Hindu Affiliation  Unknown

 

 Race  White

 

 Ethnic Group  Not  or 









Author







 Name  Monique Powell NP (transmitted by agent of provider Yanet Khan)

 

 Address  [address] [place] [address]









Care Team Providers







 Name  Role  Phone

 

 Ameya Celestin D.O. - Family Medicine  Care Team Information   [phone]
[phone]









Problems







 Description

 

 No Information Available







Social History







 Type  Date  Description  Comments

 

 Birth Sex    Unknown  

 

 Tobacco Use  Start: Unknown End:  Patient is a former smoker  



   Unknown    

 

 Smoking Status  Reviewed: 10/18/19  Patient is a former smoker  

 

 Exercise Type/Frequency    Does not exercise  







Allergies, Adverse Reactions, Alerts







 Description

 

 No Known Drug Allergies







Medications







 Active Medications  SIG  Qnty  Indications  Ordering Provider  Date

 

 Oxycodone-Acetaminoph  1 tab by mouth  10tabs    Monique B.  10/18/2019



 en  every 4 hours as      Sherry NP  



  5-325mg Tablets  needed        



           

 

 Ibuprofen 200  600mg po q6h prn  60tabs    Monique B.  10/18/2019



             200mg  pain      Eckenrode, NP  



 Tablets          



           









 History Medications









 No Active Medications        Unknown  09/10/2019 - 10/18/2019







Immunizations







 Description

 

 No Information Available







Vital Signs







 Date  Vital  Result  Comment

 

 10/18/2019  1:45pm  Height  67 inches  5'7"









 Weight  150.00 lb  

 

 Heart Rate  72 /min  

 

 BP Systolic Sitting  110 mmHg  

 

 BP Diastolic Sitting  84 mmHg  

 

 Respiratory Rate  16 /min  

 

 Body Temperature  97.3 F  

 

 BMI (Body Mass Index)  23.5 kg/m2  









 09/10/2019 11:18am  Height  67 inches  5'7"









 Weight  155.00 lb  

 

 Heart Rate  84 /min  

 

 Respiratory Rate  16 /min  

 

 Body Temperature  97.5 F  

 

 BMI (Body Mass Index)  24.3 kg/m2  







Results







 Description

 

 No Information Available







Procedures







 Description

 

 No Information Available







Medical Devices







 Description

 

 No Information Available







Encounters







 Type  Date  Location  Provider  Dx  Diagnosis

 

 Office Visit  09/10/2019  [facility]  Marciano KILLIAN  K40.90  Unil 
inguinal



   10:45a  [facility]  MD Tin    hernia, w/o obst



           or gangr, not



           spcf as recur









 K40.90  Unil inguinal hernia, w/o obst or gangr, not spcf as recur







Assessments







 Date  Code  Description  Provider

 

 10/18/2019  K40.90  Unilateral inguinal hernia, without  Monique Powell NP



     obstruction or gangrene, not specified  



     as recurrent  

 

 10/18/2019  Z01.818  Encounter for other preprocedural  Monique Powell NP



     examination  

 

 10/16/2019  K40.90  Unilateral inguinal hernia, without  Marciano Castle MD



     obstruction or gangrene, not specified  



     as recurrent  

 

 10/08/2019  K40.90  Unilateral inguinal hernia, without  Marciano Castle MD



     obstruction or gangrene, not specified  



     as recurrent  

 

 09/10/2019  K40.90  Unilateral inguinal hernia, without  Marciano Castle MD



     obstruction or gangrene, not specified  



     as recurrent  

 

 09/10/2019  K40.90  Unilateral inguinal hernia, without  Marciano Castle MD



     obstruction or gangrene, not specified  



     as recurrent  







Plan of Treatment

Future Appointment(s):10/30/2019 10:00 am - [facility] - Monique Powell NPK40.90 Unilateral 
inguinal hernia, without obstruction or gangrene, not specified as 
recurrentFollow up:thnhui36/30/19Z01.818 Encounter for other preprocedural 
examination



Functional Status







 Description

 

 No Information Available







Mental Status







 Description

 

 No Information Available







Referrals







 Description

 

 No Information Available

## 2019-11-01 NOTE — XMS REPORT
Continuity of Care Document (CCD)

 Created on:September 10, 2019



Patient:René Greenfield

Sex:Male

:1975

External Reference #:[id number]





Demographics







 Address  [address] [place] [address]

 

 Mobile Phone  [phone]

 

 Email Address  [email or web address]

 

 Preferred Language  en

 

 Marital Status  Not  or 

 

 Christian Affiliation  Unknown

 

 Race  White

 

 Ethnic Group  Not  or 









Author







 Name  Marciano Castle MD (transmitted by agent of provider Ivett Gutierres)

 

 Address  [address] [place] [address]









Care Team Providers







 Name  Role  Phone

 

 Ameya Celestin D.O. - Family Medicine  Care Team Information   [phone]
[phone]









Problems







 Description

 

 No Information Available







Social History







 Type  Date  Description  Comments

 

 Birth Sex    Unknown  

 

 Tobacco Use  Start: Unknown End:  Patient is a former smoker  



   Unknown    

 

 Smoking Status  Reviewed: 09/10/19  Patient is a former smoker  

 

 Exercise Type/Frequency    Does not exercise  







Allergies, Adverse Reactions, Alerts







 Description

 

 No Known Drug Allergies







Medications







 Description

 

 No Active Medications







Immunizations







 Description

 

 No Information Available







Vital Signs







 Date  Vital  Result  Comment

 

 09/10/2019 11:18am  Height  67 inches  5'7"









 Weight  155.00 lb  

 

 Heart Rate  84 /min  

 

 Respiratory Rate  16 /min  

 

 Body Temperature  97.5 F  

 

 BMI (Body Mass Index)  24.3 kg/m2  







Results







 Description

 

 No Information Available







Procedures







 Description

 

 No Information Available







Medical Devices







 Description

 

 No Information Available







Encounters







 Description

 

 No Information Available







Assessments







 Date  Code  Description  Provider

 

 09/10/2019  K40.90  Unilateral inguinal hernia, without  Marciano Castle MD



     obstruction or gangrene, not specified as  



     recurrent  







Plan of Treatment

No Information Available



Functional Status







 Description

 

 No Information Available







Mental Status







 Description

 

 No Information Available







Referrals







 Description

 

 No Information Available

## 2019-11-01 NOTE — XMS REPORT
Summary of Care

 Created on:2019



Patient:Adam Greenfield

Sex:Male

:1975

External Reference #:[id number]





Demographics







 Address  [address] [place] [address]

 

 Mobile Phone  [phone]

 

 Preferred Language  English

 

 Marital Status   or 

 

 Synagogue Affiliation  Unknown

 

 Race  White

 

 Ethnic Group   or 









Author







 Organization  [facility]

 

 Address  [address]



   [place] [address]









Support







 Name  Relationship  Address  Phone

 

 Susie Greenfield  Unavailable  Unavailable  [phone]









Care Team Providers







 Name  Role  Phone

 

 Ameya Celestin DO  Primary Care Provider  [phone]









Reason for Referral

MRI/CAT/PET Scan (Emergency)





 Status  Reason  Specialty  Diagnoses /  Referred By  Referred To



       Procedures  Contact  Contact

 

 Authorized    Radiology  Diagnoses



Left inguinal hernia



Nausea  Ameya Celestin DO



  [address]







       



Procedures



CT ABDOMEN PELVIS WITH IV CONTRAST  [address]



  [address] [place] [address]







         Phone:  Phone:



         [phone]







         Fax: [phone]  Fax: [phone]









Reason for Visit







 Reason  Comments

 

 Follow Up  dx hernia? saw hospital  after hearing a pop and pain while at 
work;



   [facility]; cont. increased headaches and



   nausea noted question related to hernia and what to do to elevate.







Encounter Details







 Date  Type  Department  Care Team  Description

 

 2019  Office Visit  [facility] Ameya Hayward DO



  Left inguinal hernia (Primary Dx);



     Practice



  [address] [facility]



  Nausea



     [address] [place] [address]



  



     [place] [address]



  [phone] (Fax)  







Allergies

No Known Allergiesdocumented as of this encounter (statuses as of 2019)



Medications

No known medicationsdocumented as of this encounter (statuses as of 2019)



Active Problems

No known active problemsdocumented as of this encounter (statuses as of 2019)



Social History







 Tobacco Use  Types  Packs/Day  Years Used  Date

 

 Former Smoker    0.5  10  Quit: 









 Smokeless Tobacco: Never Used      









 Alcohol Use  Drinks/Week  oz/Week  Comments

 

 Not Currently      









 Sex Assigned at Birth  Date Recorded

 

 Not on file  









 Job Start Date  Occupation  Industry

 

 Not on file  Not on file  Not on file









 Travel History  Travel Start  Travel End









 No recent travel history available.



documented as of this encounter



Last Filed Vital Signs







 Vital Sign  Reading  Time Taken  Comments

 

 Blood Pressure  118/68  2019 10:40 AM EDT  

 

 Pulse  63  2019 10:40 AM EDT  

 

 Temperature  -  -  

 

 Respiratory Rate  -  -  

 

 Oxygen Saturation  97%  2019 10:40 AM EDT  

 

 Inhaled Oxygen Concentration  -  -  

 

 Weight  67 kg (147 lb 9.6 oz)  2019 10:40 AM EDT  

 

 Height  170.2 cm (5' 7")  2019 10:40 AM EDT  

 

 Body Mass Index  23.12  2019 10:40 AM EDT  



documented in this encounter



Progress Notes

Ameya Celestin, DO - 2019 10:20 AM EDTFormatting of this note might be 
different from the original.

PATIENT:  Adam Greenfield

MRN:  [id number]

:  1975

DATE OF SERVICE:  2019



CHIEF COMPLAINT:

Chief Complaint

Patient presents with

 Follow Up

  dx hernia? saw hospital  after hearing a pop and pain while at work; [facility]; cont. increased headaches and nausea 
noted question related to hernia and what to do to elevate.



Subjective

HISTORY OF PRESENT ILLNESS:

Adam Greenfield is a 44-y.o. male.

HPI

 reports getting bulge and pain in left groin

Tells me went er same day and got clinical diagnosis of hernia

Met with surgeon at [place] yesterday who agrees hernia and plans surgery next 
month

Tells me on and off nausea in last 10 days

One time vomiting

Bowel movements are good

No blood in stool or black stools

Passing gas

Appetite a bit low

Hernia site painful at times with cough, sneeze or lifting to trying to avoid 
those factors

At rest he is ok





Past Medical History:

Diagnosis Date

 Renal stone



Family History

Problem Relation Age of Onset

 Cancer Paternal Grandmother



No current outpatient medications on file.



No current facility-administered medications for this visit.



No Known Allergies

Social History



Socioeconomic History

 Marital status: 

  Spouse name: Not on file

 Number of children: Not on file

 Years of education: Not on file

 Highest education level: Not on file

Occupational History

 Not on file

Social Needs

 Financial resource strain: Not on file

 Food insecurity:

  Worry: Not on file

  Inability: Not on file

 Transportation needs:

  Medical: Not on file

  Non-medical: Not on file

Tobacco Use

 Smoking status: Former Smoker

  Packs/day: 0.50

  Years: 10.00

  Pack years: 5.00

  Last attempt to quit: 2013

  Years since quittin.6

 Smokeless tobacco: Never Used

Substance and Sexual Activity

 Alcohol use: Not Currently

 Drug use: Never

 Sexual activity: Yes

  Partners: Female

Lifestyle

 Physical activity:

  Days per week: Not on file

  Minutes per session: Not on file

 Stress: Not on file

Relationships

 Social connections:

  Talks on phone: Not on file

  Gets together: Not on file

  Attends Catholic service: Not on file

  Active member of club or organization: Not on file

  Attends meetings of clubs or organizations: Not on file

  Relationship status: Not on file

 Intimate partner violence:

  Fear of current or ex partner: Not on file

  Emotionally abused: Not on file

  Physically abused: Not on file

  Forced sexual activity: Not on file

Other Topics Concern

 Not on file

Social History Narrative

 Moved from [place] as met a woman

 

 Work: not now







REVIEW OF SYSTEMS:

Review of Systems

Constitutional: Negative for fever.

Cardiovascular: Negative for chest pain.

Neurological: Negative for dizziness.



Objective

PHYSICAL EXAM:

VITALS:  /68 (BP Location: Left arm, Patient Position: Sitting)  | Pulse 
63  | Ht 5' 7" (1.702m)  | Wt 147 lb 9.6 oz (67 kg)  | SpO2 97%  | BMI 23.12 kg/
m  Body mass index is 23.12 kg/m.

Physical Exam

Constitutional: He appears well-developed and well-nourished. No distress.

HENT:

Head: Normocephalic and atraumatic.

Mouth/Throat: Oropharynx is clear and moist. No oropharyngeal exudate.

Eyes: Conjunctivae are normal. Right eye exhibits no discharge. Left eye 
exhibits no discharge. No scleral icterus.

Cardiovascular: Normal rate and regular rhythm.

Pulmonary/Chest: Effort normal and breath sounds normal.

Abdominal: Soft. He exhibits no distension.

Reducible left inguinal hernia though had some pain with that reducing



Skin: He is not diaphoretic.





ASSESSMENT / IMPRESSION:

  ICD-9-CM ICD-10-CM

1. Left inguinal hernia 550.90 K40.90 CBC WITH DIFFERENTIAL

   COMPREHENSIVE METABOLIC PANEL

   CT ABDOMEN PELVIS WITH IV CONTRAST

   COMPREHENSIVE METABOLIC PANEL

   CBC WITH DIFFERENTIAL

2. Nausea 787.02 R11.0 CBC WITH DIFFERENTIAL

   COMPREHENSIVE METABOLIC PANEL

   CT ABDOMEN PELVIS WITH IV CONTRAST

   COMPREHENSIVE METABOLIC PANEL

   CBC WITH DIFFERENTIAL





  Plan

I spoke with surgeon he saw yesterday

hernia left inguinal by exam. reducible. however his symptoms sounds like 
incarcerated/straungulatedbut exam it doesn't indicate that. Surgeon suggests CT

Told him to watch for BM issue, not passing gas, vomiting again, doubling down 
abdominal pain or other alarming signs and if they happen, call 911





Author:  Ameya Celestin DO 2019 13:13Electronically signed by Ameya Celestin DO at 2019  1:18 PM EDTdocumented in this encounter



Plan of Treatment







 Date  Type  Specialty  Care Team  Description

 

 2019  Ancillary Procedure  Radiology    









 Name  Type  Priority  Associated Diagnoses  Date/Time

 

 CBC WITH DIFFERENTIAL  Lab  Routine  Left inguinal hernia



  2019 11:30 AM



       Nausea  EDT

 

 COMPREHENSIVE METABOLIC  Lab  Routine  Left inguinal hernia



  2019 11:30 AM



 PANEL      Nausea  EDT









 Name  Type  Priority  Associated Diagnoses  Order Schedule

 

 CBC WITH DIFFERENTIAL  Lab  Routine  Left inguinal hernia



  Expected: 2019



       Nausea  (Approximate),



         Expires: 2020

 

 COMPREHENSIVE METABOLIC  Lab  Routine  Left inguinal hernia



  Expected: 2019



 PANEL      Nausea  (Approximate),



         Expires: 2020

 

 CT ABDOMEN PELVIS WITH IV  Imaging  STAT  Left inguinal hernia



  Expected:



 CONTRAST      Nausea  2019, Expires:



         09/10/2020









 Health Maintenance  Due Date  Last Done  Comments

 

 HIV SCREENING  2019    Postponed from 1990 (Patient



       refused)

 

 DEPRESSION SCREENING  2020  

 

 INFLUENZA VACCINE (#1)  2020    Postponed from 2019 (Patient



       refused)

 

 LIPID DISORDER SCREENING  2023  

 

 COLONOSCOPY SCREENING  01/10/2024  01/10/2019  

 

 HPV IMMUNIZATION SERIES  Aged Out    No longer eligible based on patient's



       age to complete this topic

 

 MENINGOCOCCAL VACCINE IMM  Aged Out    No longer eligible based on patient's



       age to complete this topic

 

 PNEUMOCOCCAL 0-64 YRS  Aged Out    No longer eligible based on patient's



       age to complete this topic



documented as of this encounter



Results

Not on filedocumented in this encounter



Visit Diagnoses







 Diagnosis

 

 Left inguinal hernia - Primary







 Inguinal hernia without mention of obstruction or gangrene, unilateral or



 unspecified, (not specified as recurrent)

 

 Nausea







 Nausea alone



documented in this encounter



Insurance







 Payer  Benefit Plan / Group  Subscriber ID  Effective Dates  Phone  Address  
Type

 

 RICKY PALACIOS [facility]  xxxxxxxxxxx  2019-Present      Ricky









 Guarantor Name  Account Type  Relation to  Date of Birth  Phone  Billing 
Address



     Patient      

 

 Adam Greenfield  Personal/Family  Self  1975    [address] [place] [place]



           [address]



documented as of this encounter

## 2020-02-26 NOTE — RAD
Indication: Right-sided abdominal pain.



Flat and upright views of the abdomen demonstrates no free air. No dilated loops of bowel

are noted. Organ silhouettes are unremarkable.



IMPRESSION: Unremarkable abdomen series. neck shoulder back

## 2022-07-17 ENCOUNTER — HOSPITAL ENCOUNTER (EMERGENCY)
Age: 47
Discharge: HOME | End: 2022-07-17
Attending: EMERGENCY MEDICINE
Payer: COMMERCIAL

## 2022-07-17 VITALS
DIASTOLIC BLOOD PRESSURE: 77 MMHG | HEART RATE: 77 BPM | SYSTOLIC BLOOD PRESSURE: 112 MMHG | OXYGEN SATURATION: 97 % | RESPIRATION RATE: 24 BRPM | TEMPERATURE: 98.6 F

## 2022-07-17 DIAGNOSIS — J06.9 VIRAL UPPER RESPIRATORY TRACT INFECTION: Primary | ICD-10-CM

## 2022-07-17 LAB
SARS-COV-2, RAPID: DETECTED
SPECIMEN DESCRIPTION: ABNORMAL

## 2022-07-17 PROCEDURE — 87635 SARS-COV-2 COVID-19 AMP PRB: CPT

## 2022-07-17 PROCEDURE — 99283 EMERGENCY DEPT VISIT LOW MDM: CPT

## 2022-07-17 PROCEDURE — 6370000000 HC RX 637 (ALT 250 FOR IP): Performed by: STUDENT IN AN ORGANIZED HEALTH CARE EDUCATION/TRAINING PROGRAM

## 2022-07-17 RX ORDER — IBUPROFEN 800 MG/1
800 TABLET ORAL ONCE
Status: COMPLETED | OUTPATIENT
Start: 2022-07-17 | End: 2022-07-17

## 2022-07-17 RX ORDER — ACETAMINOPHEN 500 MG
1000 TABLET ORAL ONCE
Status: COMPLETED | OUTPATIENT
Start: 2022-07-17 | End: 2022-07-17

## 2022-07-17 RX ORDER — ACETAMINOPHEN 325 MG/1
650 TABLET ORAL EVERY 6 HOURS PRN
Qty: 30 TABLET | Refills: 0 | Status: SHIPPED | OUTPATIENT
Start: 2022-07-17 | End: 2022-07-27

## 2022-07-17 RX ORDER — IBUPROFEN 600 MG/1
600 TABLET ORAL EVERY 8 HOURS PRN
Qty: 30 TABLET | Refills: 0 | Status: SHIPPED | OUTPATIENT
Start: 2022-07-17 | End: 2022-07-27

## 2022-07-17 RX ORDER — GUAIFENESIN 600 MG/1
600 TABLET, EXTENDED RELEASE ORAL ONCE
Status: COMPLETED | OUTPATIENT
Start: 2022-07-17 | End: 2022-07-17

## 2022-07-17 RX ORDER — GUAIFENESIN 600 MG/1
600 TABLET, EXTENDED RELEASE ORAL 2 TIMES DAILY
Qty: 10 TABLET | Refills: 0 | Status: SHIPPED | OUTPATIENT
Start: 2022-07-17 | End: 2022-07-22

## 2022-07-17 RX ADMIN — ACETAMINOPHEN 1000 MG: 500 TABLET ORAL at 09:50

## 2022-07-17 RX ADMIN — GUAIFENESIN 600 MG: 600 TABLET, EXTENDED RELEASE ORAL at 09:53

## 2022-07-17 RX ADMIN — IBUPROFEN 800 MG: 800 TABLET, FILM COATED ORAL at 09:50

## 2022-07-17 ASSESSMENT — ENCOUNTER SYMPTOMS
SHORTNESS OF BREATH: 0
BACK PAIN: 0
ABDOMINAL PAIN: 0
TROUBLE SWALLOWING: 0
CHEST TIGHTNESS: 0
NAUSEA: 0
COUGH: 1
RHINORRHEA: 1
COLOR CHANGE: 0
VOMITING: 0
DIARRHEA: 0

## 2022-07-17 ASSESSMENT — PAIN SCALES - GENERAL: PAINLEVEL_OUTOF10: 10

## 2022-07-17 NOTE — ED PROVIDER NOTES
[facility]  ED  Emergency Department Encounter  EmergencyMedicine Resident     Pt Juan David Sheets  MRN: [id number]  [place] 1975  Date of evaluation: 7/17/22  PCP:  No primary care provider on file. This patient was evaluated in the Emergency Department for symptoms described in the history of present illness. The patient was evaluated in the context of the global COVID-19 pandemic, which necessitated consideration that the patient might be at risk for infection with the SARS-CoV-2 virus that causes COVID-19. Institutional protocols and algorithms that pertain to the evaluation of patients at risk for COVID-19 are in a state of rapid change based on information released by regulatory bodies including the CDC and federal and state organizations. These policies and algorithms were followed during the patient's care in the ED. CHIEF COMPLAINT       Chief Complaint   Patient presents with    Fever    Headache    Nasal Congestion    Concern For COVID-19       HISTORY OF PRESENT ILLNESS  (Location/Symptom, Timing/Onset, Context/Setting, Quality, Duration, Modifying Factors, Severity.)      Natalio Murguia is a 52 y.o. male who presents with upper respiratory symptoms, cough, congestion, postnasal drip, no chest pain no shortness of breath. Patient has had subjective fevers, some mild nausea, no vomiting, no diarrhea. Patient has had the symptoms for the past 3 to 4 days. No sick contacts at work. Patient is not vaccinated for COVID. PAST MEDICAL / SURGICAL / SOCIAL / FAMILY HISTORY     Patient denies any remarkable medical or surgical history. Patient does not take any medications. He states that he does not have a primary care doctor.     Social History     Socioeconomic History    Marital status: Single     Spouse name: Not on file    Number of children: Not on file    Years of education: Not on file    Highest education level: Not on file   Occupational History    Not on file Tobacco Use    Smoking status: Not on file    Smokeless tobacco: Not on file   Substance and Sexual Activity    Alcohol use: Not on file    Drug use: Not on file    Sexual activity: Not on file   Other Topics Concern    Not on file   Social History Narrative    Not on file     Social Determinants of Health     Financial Resource Strain: Not on file   Food Insecurity: Not on file   Transportation Needs: Not on file   Physical Activity: Not on file   Stress: Not on file   Social Connections: Not on file   Intimate Partner Violence: Not on file   Housing Stability: Not on file       History reviewed. No pertinent family history. Allergies:  Patient has no known allergies. Home Medications:  Prior to Admission medications    Medication Sig Start Date End Date Taking? Authorizing Provider   guaiFENesin (MUCINEX) 600 MG extended release tablet Take 1 tablet by mouth in the morning and 1 tablet before bedtime. Do all this for 5 days. 7/17/22 7/22/22 Yes Angella Davalos DO   acetaminophen (TYLENOL) 325 MG tablet Take 2 tablets by mouth every 6 hours as needed for Pain 7/17/22 7/27/22 Yes Angella Davalos DO   ibuprofen (ADVIL;MOTRIN) 600 MG tablet Take 1 tablet by mouth every 8 hours as needed for Pain 7/17/22 7/27/22 Yes Angella Davalos DO       REVIEW OF SYSTEMS    (2-9 systems for level 4, 10 or more for level 5)      Review of Systems   Constitutional:  Positive for fever. Negative for chills and fatigue. HENT:  Positive for rhinorrhea. Negative for congestion and trouble swallowing. Eyes:  Negative for visual disturbance. Respiratory:  Positive for cough. Negative for chest tightness and shortness of breath. Gastrointestinal:  Negative for abdominal pain, diarrhea, nausea and vomiting. Endocrine: Negative for polyuria. Genitourinary:  Negative for dysuria, flank pain, hematuria and urgency. Musculoskeletal:  Negative for back pain and myalgias. Skin:  Negative for color change. Allergic/Immunologic: Negative for immunocompromised state. Neurological:  Negative for dizziness, syncope, weakness, light-headedness and headaches. PHYSICAL EXAM   (up to 7 for level 4, 8 or more for level 5)      INITIAL VITALS:   /77   Pulse 77   Temp 98.6 °F (37 °C) (Oral)   Resp 24   SpO2 97%     Physical Exam  Constitutional:       Appearance: He is well-developed. HENT:      Head: Normocephalic and atraumatic. Nose: Nose normal.      Mouth/Throat:      Mouth: Mucous membranes are moist.   Eyes:      Conjunctiva/sclera: Conjunctivae normal.   Cardiovascular:      Rate and Rhythm: Normal rate and regular rhythm. Heart sounds: Normal heart sounds. No murmur heard. No friction rub. Pulmonary:      Effort: Pulmonary effort is normal. No respiratory distress. Breath sounds: Normal breath sounds. No rales. Chest:      Chest wall: No tenderness. Abdominal:      General: Abdomen is flat. Palpations: Abdomen is soft. There is no hepatomegaly, splenomegaly or pulsatile mass. Tenderness: There is no abdominal tenderness. Hernia: No hernia is present. Skin:     General: Skin is warm. Capillary Refill: Capillary refill takes less than 2 seconds. Neurological:      General: No focal deficit present. Mental Status: He is alert. Motor: No weakness. DIFFERENTIAL  DIAGNOSIS     PLAN (LABS / IMAGING / EKG):  Orders Placed This Encounter   Procedures    COVID-19, Rapid       MEDICATIONS ORDERED:  Orders Placed This Encounter   Medications    acetaminophen (TYLENOL) tablet 1,000 mg    ibuprofen (ADVIL;MOTRIN) tablet 800 mg    guaiFENesin (MUCINEX) extended release tablet 600 mg    guaiFENesin (MUCINEX) 600 MG extended release tablet     Sig: Take 1 tablet by mouth in the morning and 1 tablet before bedtime. Do all this for 5 days.      Dispense:  10 tablet     Refill:  0    acetaminophen (TYLENOL) 325 MG tablet     Sig: Take 2 tablets by mouth every 6 hours as needed for Pain     Dispense:  30 tablet     Refill:  0    ibuprofen (ADVIL;MOTRIN) 600 MG tablet     Sig: Take 1 tablet by mouth every 8 hours as needed for Pain     Dispense:  30 tablet     Refill:  0       DDX: Rhinovirus, enterovirus, Influenza COVID, Viral URI, Pneumonia, ARDS,       DIAGNOSTIC RESULTS / EMERGENCY DEPARTMENT COURSE / MDM   LAB RESULTS:  Results for orders placed or performed during the hospital encounter of 07/17/22   COVID-19, Rapid    Specimen: Nasopharyngeal Swab   Result Value Ref Range    Specimen Description . NASOPHARYNGEAL SWAB     SARS-CoV-2, Rapid DETECTED (A) Not Detected           Assessment/Plan: 80-year-old male presenting with upper respiratory tract symptoms. Tested positive for COVID. Treated conservatively with Tylenol ibuprofen and Mucinex. Patient had symptomatic improvement. Patient was advised to follow-up with primary care provider, patient provided with information to set up primary care provider. No further intervention indicated at this time. Patient was hemodynamically stable and appropriate for discharge. FINAL IMPRESSION      1. Viral upper respiratory tract infection          DISPOSITION / PLAN     DISPOSITION Decision To Discharge 07/17/2022 09:38:51 AM      PATIENT REFERRED TO:  [facility]  [address] [address]  [phone]  Go to   As needed    [facility] [address]  [phone]  Schedule an appointment as soon as possible for a visit in 3 days      DISCHARGE MEDICATIONS:  Discharge Medication List as of 7/17/2022 11:57 AM        START taking these medications    Details   guaiFENesin (MUCINEX) 600 MG extended release tablet Take 1 tablet by mouth in the morning and 1 tablet before bedtime. Do all this for 5 days. , Disp-10 tablet, R-0Print      acetaminophen (TYLENOL) 325 MG tablet Take 2 tablets by mouth every 6 hours as needed for Pain, Disp-30 tablet, R-0Print ibuprofen (ADVIL;MOTRIN) 600 MG tablet Take 1 tablet by mouth every 8 hours as needed for Pain, Disp-30 tablet, R-0Print             Marilyn Gomez DO  Emergency Medicine Resident    (Please note that portions of thisnote were completed with a voice recognition program.  Efforts were made to edit the dictations but occasionally words are mis-transcribed.)  Marla Gutiérrez DO  Resident  07/17/22 0565

## 2022-07-17 NOTE — ED NOTES
Patient states that he is congested, chills and covid like symptoms      Jimmy Vieira, MARION  07/17/22 7250

## 2022-07-17 NOTE — ED PROVIDER NOTES
COURSE:     -------------------------  BP: 116/74, Temp: 98.6 °F (37 °C), Heart Rate: 77, Resp: 24  Physical Exam  Constitutional:       Appearance: He is well-developed. He is not diaphoretic. HENT:      Head: Normocephalic and atraumatic. Right Ear: External ear normal.      Left Ear: External ear normal.   Eyes:      General: No scleral icterus. Right eye: No discharge. Left eye: No discharge. Neck:      Trachea: No tracheal deviation. Pulmonary:      Effort: Pulmonary effort is normal. No respiratory distress. Breath sounds: No stridor. Comments: Lisseth Romanian to auscultation bilaterally no wheezes no murmurs patient has no acute respiratory distress  Musculoskeletal:         General: Normal range of motion. Cervical back: Normal range of motion. Skin:     General: Skin is warm and dry. Neurological:      Mental Status: He is alert and oriented to person, place, and time. Coordination: Coordination normal.   Psychiatric:         Behavior: Behavior normal.       Comments  Patient can be given symptomatic treatment at 4 days and no risk factors patient is not a candidate for PAXLOVID (R)    The patient's signs and symptoms are consistent with an acute mild viral illness. At this time there is significant evidence of Covid-19 community spread due to this pandemic, and I feel the patient most likely has mild Covid-19 or other viral illness. The patient is nontoxic and well appearing, no evidence of hypoxia or impending respiratory failure. The patient is tolerating PO. I do not feel the patient has evidence of significant dehydration or end organ failure at this time. The patient does not have risk factors for severe disease such as cardiovascular disease, hypertension, uncontrolled diabetes, chronic respiratory disease, underlying malignancy, immunocompromised, Age > 60 years.     The patient is referred to appropriate outpatient follow up through their PCP or [address] [place] [address] Primary Care. Judie Tinajero DO,, , RDMS.   Attending Emergency Physician          Judie Tinajero DO  07/17/22 1023

## 2022-07-17 NOTE — Clinical Note
Saw Samuels was seen and treated in our emergency department on 7/17/2022. He may return to work on 07/19/2022. If you have any questions or concerns, please don't hesitate to call.       Kaykay Alfonso, DO

## 2022-07-17 NOTE — DISCHARGE INSTRUCTIONS
You have been seen in the ER today for upper respiratory symptoms. You have tested positive for covid  If you begin to experience any symptoms such as chest pain shortness of breath nausea vomiting dizziness drowsiness abdominal pain loss of consciousness or any other symptoms you find concerning please return to the ED for follow-up evaluation. If you have been given medication please take them as prescribed for the pain. Do not take more medication than recommended at any given time. Please follow-up with your primary care provider within 5 to 7 days for continued care.

## 2022-07-17 NOTE — Clinical Note
Gaviota Fallon was seen and treated in our emergency department on 7/17/2022. He may return to work on 07/19/2022. If you have any questions or concerns, please don't hesitate to call.       Leandro Pitts, DO

## 2022-07-17 NOTE — Clinical Note
Milo Guzmán was seen and treated in our emergency department on 7/17/2022. He may return to work on 07/19/2022. If you have any questions or concerns, please don't hesitate to call.       Marietta Garcia, DO

## 2022-07-22 ENCOUNTER — OFFICE VISIT (OUTPATIENT)
Dept: PRIMARY CARE CLINIC | Age: 47
End: 2022-07-22
Payer: COMMERCIAL

## 2022-07-22 ENCOUNTER — HOSPITAL ENCOUNTER (OUTPATIENT)
Age: 47
Setting detail: SPECIMEN
Discharge: HOME | End: 2022-07-22

## 2022-07-22 VITALS — HEART RATE: 58 BPM | DIASTOLIC BLOOD PRESSURE: 72 MMHG | OXYGEN SATURATION: 97 % | SYSTOLIC BLOOD PRESSURE: 105 MMHG

## 2022-07-22 DIAGNOSIS — U07.1 COVID: Primary | ICD-10-CM

## 2022-07-22 DIAGNOSIS — U07.1 COVID: ICD-10-CM

## 2022-07-22 PROCEDURE — 99202 OFFICE O/P NEW SF 15 MIN: CPT | Performed by: INTERNAL MEDICINE

## 2022-07-22 ASSESSMENT — PATIENT HEALTH QUESTIONNAIRE - PHQ9
SUM OF ALL RESPONSES TO PHQ QUESTIONS 1-9: 0
1. LITTLE INTEREST OR PLEASURE IN DOING THINGS: 0
SUM OF ALL RESPONSES TO PHQ9 QUESTIONS 1 & 2: 0
SUM OF ALL RESPONSES TO PHQ QUESTIONS 1-9: 0
2. FEELING DOWN, DEPRESSED OR HOPELESS: 0

## 2022-07-22 ASSESSMENT — ENCOUNTER SYMPTOMS
FLU SYMPTOMS: 1
COUGH: 1

## 2022-07-22 NOTE — PROGRESS NOTES
[facility]  [address] [address]  Dept: [phone]  Dept Fax: [phone]    Serene Hsu is a 52 y.o. male who presents to the urgent care today for his medicalconditions/complaints as noted below. Serene Hsu is c/o of Chest Congestion (Runny nose covid + on 7/17/22)      HPI:     Influenza  This is a new problem. The current episode started in the past 7 days. The problem occurs constantly. The problem has been unchanged. Associated symptoms include congestion and coughing. Nothing aggravates the symptoms. He has tried nothing for the symptoms. The treatment provided no relief. Was positive for COVID on the 17. Needs negative test to return to work. History reviewed. No pertinent past medical history. Current Outpatient Medications   Medication Sig Dispense Refill    guaiFENesin (MUCINEX) 600 MG extended release tablet Take 1 tablet by mouth in the morning and 1 tablet before bedtime. Do all this for 5 days. 10 tablet 0    acetaminophen (TYLENOL) 325 MG tablet Take 2 tablets by mouth every 6 hours as needed for Pain 30 tablet 0    ibuprofen (ADVIL;MOTRIN) 600 MG tablet Take 1 tablet by mouth every 8 hours as needed for Pain 30 tablet 0     No current facility-administered medications for this visit. No Known Allergies    Health Maintenance   Topic Date Due    COVID-19 Vaccine (1) Never done    Depression Screen  Never done    HIV screen  Never done    Hepatitis C screen  Never done    DTaP/Tdap/Td vaccine (1 - Tdap) Never done    Lipids  Never done    Colorectal Cancer Screen  Never done    Flu vaccine (1) 09/01/2022    Hepatitis A vaccine  Aged Out    Hepatitis B vaccine  Aged Out    Hib vaccine  Aged Out    Meningococcal (ACWY) vaccine  Aged Out    Pneumococcal 0-64 years Vaccine  Aged Out       Subjective:      Review of Systems   HENT:  Positive for congestion.     Respiratory:  Positive for cough. All other systems reviewed and are negative. Objective:     Physical Exam  Vitals reviewed. Constitutional:       Appearance: Normal appearance. HENT:      Head: Normocephalic and atraumatic. Skin:     General: Skin is warm and dry. Neurological:      General: No focal deficit present. Mental Status: He is alert and oriented to person, place, and time. Psychiatric:         Mood and Affect: Mood normal.         Behavior: Behavior normal.     /72   Pulse 58   SpO2 97%       Assessment:       Diagnosis Orders   1. COVID  COVID-19          Plan:      No follow-ups on file. No orders of the defined types were placed in this encounter. Orders Placed This Encounter   Procedures    COVID-19     Standing Status:   Future     Standing Expiration Date:   7/22/2023     Scheduling Instructions:      1) Due to current limited availability of the COVID-19 test, tests will be prioritized based on responses to questions above. Testing may be delayed due to volume. 2) Print and instruct patient to adhere to CDC home isolation program. (Link Above)              3) Set up or refer patient for a monitoring program.              4) Have patient sign up for and leverage [employer] (if not previously done). Order Specific Question:   Is this test for diagnosis or screening? Answer:   Diagnosis of ill patient     Order Specific Question:   Symptomatic for COVID-19 as defined by CDC? Answer:   Yes     Order Specific Question:   Date of Symptom Onset     Answer:   7/16/2022     Order Specific Question:   Hospitalized for COVID-19? Answer:   No     Order Specific Question:   Admitted to ICU for COVID-19? Answer:   No     Order Specific Question:   Employed in healthcare setting? Answer:   No     Order Specific Question:   Resident in a congregate (group) care setting? Answer:   No     Order Specific Question:   Pregnant:      Answer:   No            Patient given educational materials - see patient instructions. Discussed use, benefit, and side effects of prescribed medications. All patientquestions answered. Pt voiced understanding.     Electronically signed by Sandra Batista MD on 7/22/2022at 10:01 AM

## 2022-07-23 LAB
SARS-COV-2: ABNORMAL
SARS-COV-2: ABNORMAL
SOURCE: ABNORMAL

## 2022-07-25 ENCOUNTER — NURSE ONLY (OUTPATIENT)
Dept: PRIMARY CARE CLINIC | Age: 47
End: 2022-07-25

## 2022-07-25 DIAGNOSIS — Z76.89 RETURN TO WORK EVALUATION: Primary | ICD-10-CM

## 2022-07-29 ENCOUNTER — OFFICE VISIT (OUTPATIENT)
Dept: FAMILY MEDICINE CLINIC | Age: 47
End: 2022-07-29
Payer: COMMERCIAL

## 2022-07-29 ENCOUNTER — HOSPITAL ENCOUNTER (OUTPATIENT)
Age: 47
Setting detail: SPECIMEN
Discharge: HOME | End: 2022-07-29

## 2022-07-29 VITALS — WEIGHT: 117 LBS | HEART RATE: 67 BPM | DIASTOLIC BLOOD PRESSURE: 70 MMHG | SYSTOLIC BLOOD PRESSURE: 103 MMHG

## 2022-07-29 DIAGNOSIS — Z00.00 ENCOUNTER FOR MEDICAL EXAMINATION TO ESTABLISH CARE: ICD-10-CM

## 2022-07-29 DIAGNOSIS — Z12.11 SCREENING FOR COLON CANCER: ICD-10-CM

## 2022-07-29 DIAGNOSIS — Z00.00 HEALTH CARE MAINTENANCE: ICD-10-CM

## 2022-07-29 DIAGNOSIS — Z13.220 SCREENING FOR HYPERLIPIDEMIA: ICD-10-CM

## 2022-07-29 DIAGNOSIS — U07.1 COVID-19: Primary | ICD-10-CM

## 2022-07-29 DIAGNOSIS — Z86.16 HISTORY OF COVID-19: ICD-10-CM

## 2022-07-29 LAB
ABSOLUTE EOS #: 0.15 K/UL (ref 0–0.44)
ABSOLUTE IMMATURE GRANULOCYTE: 0.04 K/UL (ref 0–0.3)
ABSOLUTE LYMPH #: 1.76 K/UL (ref 1.1–3.7)
ABSOLUTE MONO #: 0.9 K/UL (ref 0.1–1.2)
ANION GAP SERPL CALCULATED.3IONS-SCNC: 11 MMOL/L (ref 9–17)
BASOPHILS # BLD: 1 % (ref 0–2)
BASOPHILS ABSOLUTE: 0.08 K/UL (ref 0–0.2)
BUN BLDV-MCNC: 21 MG/DL (ref 6–20)
CALCIUM SERPL-MCNC: 9.6 MG/DL (ref 8.6–10.4)
CHLORIDE BLD-SCNC: 102 MMOL/L (ref 98–107)
CHOLESTEROL/HDL RATIO: 6.5
CHOLESTEROL: 214 MG/DL
CO2: 28 MMOL/L (ref 20–31)
CREAT SERPL-MCNC: 1.03 MG/DL (ref 0.7–1.2)
EOSINOPHILS RELATIVE PERCENT: 2 % (ref 1–4)
GFR AFRICAN AMERICAN: >60 ML/MIN
GFR NON-AFRICAN AMERICAN: >60 ML/MIN
GFR SERPL CREATININE-BSD FRML MDRD: ABNORMAL ML/MIN/{1.73_M2}
GLUCOSE BLD-MCNC: 101 MG/DL (ref 70–99)
HCT VFR BLD CALC: 43.9 % (ref 40.7–50.3)
HDLC SERPL-MCNC: 33 MG/DL
HEMOGLOBIN: 15.3 G/DL (ref 13–17)
HIV AG/AB: NONREACTIVE
IMMATURE GRANULOCYTES: 1 %
LDL CHOLESTEROL DIRECT: 111 MG/DL
LDL CHOLESTEROL: ABNORMAL MG/DL (ref 0–130)
LYMPHOCYTES # BLD: 20 % (ref 24–43)
MCH RBC QN AUTO: 32.4 PG (ref 25.2–33.5)
MCHC RBC AUTO-ENTMCNC: 34.9 G/DL (ref 28.4–34.8)
MCV RBC AUTO: 93 FL (ref 82.6–102.9)
MONOCYTES # BLD: 11 % (ref 3–12)
NRBC AUTOMATED: 0 PER 100 WBC
PDW BLD-RTO: 11.7 % (ref 11.8–14.4)
PLATELET # BLD: 307 K/UL (ref 138–453)
PMV BLD AUTO: 10.2 FL (ref 8.1–13.5)
POTASSIUM SERPL-SCNC: 4.4 MMOL/L (ref 3.7–5.3)
RBC # BLD: 4.72 M/UL (ref 4.21–5.77)
SEG NEUTROPHILS: 65 % (ref 36–65)
SEGMENTED NEUTROPHILS ABSOLUTE COUNT: 5.68 K/UL (ref 1.5–8.1)
SODIUM BLD-SCNC: 141 MMOL/L (ref 135–144)
TRIGL SERPL-MCNC: 639 MG/DL
WBC # BLD: 8.6 K/UL (ref 3.5–11.3)

## 2022-07-29 PROCEDURE — G8421 BMI NOT CALCULATED: HCPCS | Performed by: STUDENT IN AN ORGANIZED HEALTH CARE EDUCATION/TRAINING PROGRAM

## 2022-07-29 PROCEDURE — G8427 DOCREV CUR MEDS BY ELIG CLIN: HCPCS | Performed by: STUDENT IN AN ORGANIZED HEALTH CARE EDUCATION/TRAINING PROGRAM

## 2022-07-29 PROCEDURE — 99203 OFFICE O/P NEW LOW 30 MIN: CPT | Performed by: STUDENT IN AN ORGANIZED HEALTH CARE EDUCATION/TRAINING PROGRAM

## 2022-07-29 PROCEDURE — 4004F PT TOBACCO SCREEN RCVD TLK: CPT | Performed by: STUDENT IN AN ORGANIZED HEALTH CARE EDUCATION/TRAINING PROGRAM

## 2022-07-29 ASSESSMENT — ENCOUNTER SYMPTOMS
VOMITING: 0
PHOTOPHOBIA: 0
SHORTNESS OF BREATH: 0
DIARRHEA: 0
APNEA: 0
BACK PAIN: 0
COUGH: 0
CONSTIPATION: 0
COLOR CHANGE: 0
WHEEZING: 0
ABDOMINAL PAIN: 0
NAUSEA: 0
ABDOMINAL DISTENTION: 0

## 2022-07-29 ASSESSMENT — PATIENT HEALTH QUESTIONNAIRE - PHQ9
1. LITTLE INTEREST OR PLEASURE IN DOING THINGS: 0
SUM OF ALL RESPONSES TO PHQ QUESTIONS 1-9: 0
SUM OF ALL RESPONSES TO PHQ9 QUESTIONS 1 & 2: 0
SUM OF ALL RESPONSES TO PHQ QUESTIONS 1-9: 0
2. FEELING DOWN, DEPRESSED OR HOPELESS: 0

## 2022-07-29 NOTE — PATIENT INSTRUCTIONS
Thank you for letting us take care of you today. We hope all your questions were addressed. If a question was overlooked or something else comes to mind after you return home, please contact a member of your Care Team listed below. Your Care Team at [place] [address] is Team #4  Monik Hugo MD (Faculty)  Sasha Villegas MD (Resident)  Jean Stephenson MD (Resident)  Stanton Black MD (Resident)  Vika Burrows MD (Resident)  DANICA Garcia, BIBI Urrutia., Jakob Burgess., [facility] office)  Cecile Sullivan, [address] (Clinical Practice Manager)  Vonda Willis [facility] (Clinical Pharmacist)       Office phone number: [phone]    If you need to get in right away due to illness, please be advised we have \"Same Day\" appointments available Monday-Friday. Please call us at [phone] option #3 to schedule your \"Same Day\" appointment.

## 2022-07-29 NOTE — PROGRESS NOTES
Attending Physician Statement  I have discussed the care of Winter Tijerina, including pertinent history and exam findings,  with the resident. I have reviewed the key elements of all parts of the encounter with the resident. I agree with the assessment, plan and orders as documented by the resident.   (Andre Newsome)    Lizabeth Griffiths MD

## 2022-07-29 NOTE — PROGRESS NOTES
Subjective:    Carla Richardson is a 52 y.o. male with  has no past medical history on file. History reviewed. No pertinent family history. Presented tothe office today for:  Chief Complaint   Patient presents with    New Patient       HPI    Carla Richardson is a 71-year-old male with no significant PMH on file. Patient is here today to establish care with this office. Per chart review, patient was recently tested for COVID-19 on 7/20/2022 and was positive. Patient was subsequently prescribed motrin and tylenol. Patient was instructed to follow-up and establish care with a PCP. Patient states his COVID symptoms have completely resolved. He denies any new complaints. Denies any fever, chills, headaches, dizziness, blurred vision, chest pain, SOB, palpitations, abdominal pain, nausea, vomiting, or bloody bowel movements    Occupation: work in a [employer]  Previous PCP: not sure    PMHx: none    FMHx:   -Mother: alive; CAD   -Father: alive; none   -Siblings:    Social Hx:   -Drink: occasional drinker   -Smoke: no   -Drugs: None    Allergies: NKDA  Medications: None    Review of Systems   Constitutional:  Negative for activity change, appetite change and fatigue. HENT:  Negative for congestion. Eyes:  Negative for photophobia and visual disturbance. Respiratory:  Negative for apnea, cough, shortness of breath and wheezing. Cardiovascular:  Negative for chest pain, palpitations and leg swelling. Gastrointestinal:  Negative for abdominal distention, abdominal pain, constipation, diarrhea, nausea and vomiting. Endocrine: Negative for polyuria. Genitourinary:  Negative for difficulty urinating and urgency. Musculoskeletal:  Negative for arthralgias and back pain. Skin:  Negative for color change. Neurological:  Negative for dizziness, syncope and headaches. Psychiatric/Behavioral:  Negative for agitation, behavioral problems, confusion, decreased concentration and dysphoric mood. Objective:    /70 (Site: Left Upper Arm, Position: Sitting, Cuff Size: Medium Adult)   Pulse 67   Wt 117 lb (53.1 kg)    BP Readings from Last 3 Encounters:   07/29/22 103/70   07/22/22 105/72   07/17/22 112/77     Physical Exam  Constitutional:       General: He is not in acute distress. Appearance: Normal appearance. He is not ill-appearing. HENT:      Head: Normocephalic and atraumatic. Eyes:      Extraocular Movements: Extraocular movements intact. Cardiovascular:      Rate and Rhythm: Normal rate and regular rhythm. Pulses: Normal pulses. Pulmonary:      Effort: Pulmonary effort is normal. No respiratory distress. Breath sounds: No wheezing, rhonchi or rales. Abdominal:      General: Bowel sounds are normal. There is no distension. Palpations: Abdomen is soft. Tenderness: There is no abdominal tenderness. There is no guarding. Skin:     General: Skin is warm. Neurological:      General: No focal deficit present. Mental Status: He is alert and oriented to person, place, and time. Psychiatric:         Mood and Affect: Mood normal.         No results found for: WBC, HGB, HCT, PLT, CHOL, TRIG, HDL, LDLDIRECT, ALT, AST, NA, K, CL, CREATININE, BUN, CO2, TSH, PSA, INR, GLUF, LABA1C, LABMICR  No results found for: CALCIUM, PHOS  No results found for: LDLCALC, LDLCHOLESTEROL, LDLDIRECT    Assessment and Plan:    1. Encounter for medical examination to establish care  -Ordered CBC and BMP  - CBC with Auto Differential; Future  - Basic Metabolic Panel; Future    2. History of COVID-19  -Tested positive for COVID-19 on 7/17/2022  -Patient states he has fully recovered    3. Health care maintenance  - HIV Screen; Future  - Hepatitis C Antibody; Future    4. Screening for hyperlipidemia    - Lipid Panel; Future    5.  Screening for colon cancer  -Ordered Cologuard  - Fecal DNA Colorectal cancer screening (Cologuard)          Requested Prescriptions      No prescriptions requested or ordered in this encounter       There are no discontinued medications. Return in about 1 year (around 7/29/2023). Jay Mcneill received counseling on the following healthy behaviors:   Reviewed prior labs and health maintenance  Continue current medications, diet and exercise. Discussed use, benefit, and side effects of prescribed medications. Barriers to medication compliance addressed. Patient given educational materials - see patient instructions  Was a self-tracking handout given in paper form or via [employer]? No:     Requested Prescriptions      No prescriptions requested or ordered in this encounter       All patient questions answered. Patient voiced understanding. Quality Measures    There is no height or weight on file to calculate BMI. Normal. Weight control planned discussed Healthy diet and regular exercise. BP: 103/70 Blood pressure is Normal. Treatment plan consists of No treatment change needed.     No results found for: LDLCALC, LDLCHOLESTEROL, LDLDIRECT (goal LDL reduction with dx if diabetes is 50% LDL reduction)      PHQ Scores 7/29/2022 7/22/2022   PHQ2 Score 0 0   PHQ9 Score 0 0     Interpretation of Total Score Depression Severity: 1-4 = Minimal depression, 5-9 = Mild depression, 10-14 = Moderate depression, 15-19 = Moderately severe depression, 20-27 = Severe depression

## 2022-07-29 NOTE — PROGRESS NOTES
Visit Information    Have you changed or started any medications since your last visit including any over-the-counter medicines, vitamins, or herbal medicines? no   Have you stopped taking any of your medications? Is so, why? -  no  Are you having any side effects from any of your medications? - no    Have you seen any other physician or provider since your last visit?  no   Have you had any other diagnostic tests since your last visit?  no   Have you been seen in the emergency room and/or had an admission in a hospital since we last saw you?  no   Have you had your routine dental cleaning in the past 6 months?  no     Do you have an active MyChart account? If no, what is the barrier?   Yes    No care team member to display    Medical History Review  Past Medical, Family, and Social History reviewed and does not contribute to the patient presenting condition    Health Maintenance   Topic Date Due    COVID-19 Vaccine (1) Never done    HIV screen  Never done    Hepatitis C screen  Never done    DTaP/Tdap/Td vaccine (1 - Tdap) Never done    Lipids  Never done    Colorectal Cancer Screen  Never done    Flu vaccine (1) 09/01/2022    Depression Screen  07/22/2023    Hepatitis A vaccine  Aged Out    Hepatitis B vaccine  Aged Out    Hib vaccine  Aged Out    Meningococcal (ACWY) vaccine  Aged Out    Pneumococcal 0-64 years Vaccine  Aged Out

## 2022-07-30 LAB — HEPATITIS C ANTIBODY: NONREACTIVE

## 2022-08-01 ENCOUNTER — TELEPHONE (OUTPATIENT)
Dept: FAMILY MEDICINE CLINIC | Age: 47
End: 2022-08-01

## 2022-08-01 DIAGNOSIS — E78.1 HYPERTRIGLYCERIDEMIA: Primary | ICD-10-CM

## 2022-08-01 NOTE — TELEPHONE ENCOUNTER
Reviewed lipid panel results with patient. Recommendations at this time are diet and lifestyle modifications. Instructed patient that he can take over-the-counter omega-3 fatty acids if he likes. Patient voices understanding and is in agreement. All patient questions answered.

## 2022-08-15 LAB — NONINV COLON CA DNA+OCC BLD SCRN STL QL: NEGATIVE

## 2022-08-28 PROBLEM — Z12.11 SCREENING FOR COLON CANCER: Status: RESOLVED | Noted: 2022-07-29 | Resolved: 2022-08-28

## 2023-02-28 ENCOUNTER — APPOINTMENT (OUTPATIENT)
Dept: MRI IMAGING | Age: 48
End: 2023-02-28
Payer: COMMERCIAL

## 2023-02-28 ENCOUNTER — HOSPITAL ENCOUNTER (EMERGENCY)
Age: 48
Discharge: HOME | End: 2023-03-01
Attending: EMERGENCY MEDICINE
Payer: COMMERCIAL

## 2023-02-28 VITALS
HEART RATE: 75 BPM | RESPIRATION RATE: 18 BRPM | OXYGEN SATURATION: 94 % | SYSTOLIC BLOOD PRESSURE: 110 MMHG | WEIGHT: 140 LBS | HEIGHT: 66 IN | DIASTOLIC BLOOD PRESSURE: 79 MMHG | BODY MASS INDEX: 22.5 KG/M2 | TEMPERATURE: 101.1 F

## 2023-02-28 DIAGNOSIS — M51.36 BULGING LUMBAR DISC: ICD-10-CM

## 2023-02-28 DIAGNOSIS — M50.30 BULGING OF CERVICAL INTERVERTEBRAL DISC WITHOUT MYELOPATHY: ICD-10-CM

## 2023-02-28 DIAGNOSIS — R50.9 FEVER, UNSPECIFIED: ICD-10-CM

## 2023-02-28 DIAGNOSIS — R20.0 RIGHT LEG NUMBNESS: ICD-10-CM

## 2023-02-28 DIAGNOSIS — R11.2 NAUSEA AND VOMITING, UNSPECIFIED VOMITING TYPE: Primary | ICD-10-CM

## 2023-02-28 LAB
ABSOLUTE EOS #: 0.07 K/UL (ref 0–0.44)
ABSOLUTE IMMATURE GRANULOCYTE: 0.03 K/UL (ref 0–0.3)
ABSOLUTE LYMPH #: 0.73 K/UL (ref 1.1–3.7)
ABSOLUTE MONO #: 1.03 K/UL (ref 0.1–1.2)
ALBUMIN SERPL-MCNC: 4.3 G/DL (ref 3.5–5.2)
ALBUMIN/GLOBULIN RATIO: 1.5 (ref 1–2.5)
ALP SERPL-CCNC: 69 U/L (ref 40–129)
ALT SERPL-CCNC: 18 U/L (ref 5–41)
ANION GAP SERPL CALCULATED.3IONS-SCNC: 10 MMOL/L (ref 9–17)
AST SERPL-CCNC: 17 U/L
BASOPHILS # BLD: 1 % (ref 0–2)
BASOPHILS ABSOLUTE: 0.05 K/UL (ref 0–0.2)
BILIRUB SERPL-MCNC: 0.8 MG/DL (ref 0.3–1.2)
BILIRUBIN URINE: NEGATIVE
BUN SERPL-MCNC: 22 MG/DL (ref 6–20)
CALCIUM SERPL-MCNC: 8.8 MG/DL (ref 8.6–10.4)
CASTS UA: ABNORMAL /LPF (ref 0–8)
CHLORIDE SERPL-SCNC: 98 MMOL/L (ref 98–107)
CO2 SERPL-SCNC: 28 MMOL/L (ref 20–31)
COLOR: YELLOW
CREAT SERPL-MCNC: 0.82 MG/DL (ref 0.7–1.2)
EOSINOPHILS RELATIVE PERCENT: 1 % (ref 1–4)
EPITHELIAL CELLS UA: ABNORMAL /HPF (ref 0–5)
ERYTHROCYTE [SEDIMENTATION RATE] IN BLOOD BY WESTERGREN METHOD: 6 MM/HR (ref 0–15)
FLUAV AG SPEC QL: NEGATIVE
FLUBV AG SPEC QL: NEGATIVE
GFR SERPL CREATININE-BSD FRML MDRD: >60 ML/MIN/1.73M2
GLUCOSE SERPL-MCNC: 120 MG/DL (ref 70–99)
GLUCOSE UR STRIP.AUTO-MCNC: NEGATIVE MG/DL
HCT VFR BLD AUTO: 46.9 % (ref 40.7–50.3)
HGB BLD-MCNC: 16.1 G/DL (ref 13–17)
IMMATURE GRANULOCYTES: 0 %
INR PPP: 1.1
KETONES UR STRIP.AUTO-MCNC: ABNORMAL MG/DL
LACTIC ACID, SEPSIS WHOLE BLOOD: 1.7 MMOL/L (ref 0.5–1.9)
LEUKOCYTE ESTERASE UR QL STRIP.AUTO: NEGATIVE
LIPASE SERPL-CCNC: 40 U/L (ref 13–60)
LYMPHOCYTES # BLD: 7 % (ref 24–43)
MCH RBC QN AUTO: 31 PG (ref 25.2–33.5)
MCHC RBC AUTO-ENTMCNC: 34.3 G/DL (ref 28.4–34.8)
MCV RBC AUTO: 90.2 FL (ref 82.6–102.9)
MONOCYTES # BLD: 10 % (ref 3–12)
NITRITE UR QL STRIP.AUTO: NEGATIVE
NRBC AUTOMATED: 0 PER 100 WBC
PDW BLD-RTO: 11.5 % (ref 11.8–14.4)
PLATELET # BLD AUTO: 227 K/UL (ref 138–453)
PMV BLD AUTO: 9.8 FL (ref 8.1–13.5)
POTASSIUM SERPL-SCNC: 3.7 MMOL/L (ref 3.7–5.3)
PROT SERPL-MCNC: 7.1 G/DL (ref 6.4–8.3)
PROT UR STRIP.AUTO-MCNC: 7 MG/DL (ref 5–8)
PROT UR STRIP.AUTO-MCNC: ABNORMAL MG/DL
PROTHROMBIN TIME: 11.7 SEC (ref 9.1–12.3)
RBC # BLD: 5.2 M/UL (ref 4.21–5.77)
RBC CLUMPS #/AREA URNS AUTO: ABNORMAL /HPF (ref 0–4)
SARS-COV-2 RDRP RESP QL NAA+PROBE: NOT DETECTED
SEG NEUTROPHILS: 81 % (ref 36–65)
SEGMENTED NEUTROPHILS ABSOLUTE COUNT: 8.07 K/UL (ref 1.5–8.1)
SODIUM SERPL-SCNC: 136 MMOL/L (ref 135–144)
SPECIFIC GRAVITY UA: 1.04 (ref 1–1.03)
SPECIMEN DESCRIPTION: NORMAL
TURBIDITY: CLEAR
URINE HGB: NEGATIVE
UROBILINOGEN, URINE: NORMAL
WBC # BLD AUTO: 10 K/UL (ref 3.5–11.3)
WBC UA: ABNORMAL /HPF (ref 0–5)

## 2023-02-28 PROCEDURE — 96361 HYDRATE IV INFUSION ADD-ON: CPT

## 2023-02-28 PROCEDURE — 72158 MRI LUMBAR SPINE W/O & W/DYE: CPT

## 2023-02-28 PROCEDURE — 80053 COMPREHEN METABOLIC PANEL: CPT

## 2023-02-28 PROCEDURE — 96374 THER/PROPH/DIAG INJ IV PUSH: CPT

## 2023-02-28 PROCEDURE — 85652 RBC SED RATE AUTOMATED: CPT

## 2023-02-28 PROCEDURE — 85610 PROTHROMBIN TIME: CPT

## 2023-02-28 PROCEDURE — 81001 URINALYSIS AUTO W/SCOPE: CPT

## 2023-02-28 PROCEDURE — 87635 SARS-COV-2 COVID-19 AMP PRB: CPT

## 2023-02-28 PROCEDURE — 83605 ASSAY OF LACTIC ACID: CPT

## 2023-02-28 PROCEDURE — 83690 ASSAY OF LIPASE: CPT

## 2023-02-28 PROCEDURE — 99285 EMERGENCY DEPT VISIT HI MDM: CPT

## 2023-02-28 PROCEDURE — 2580000003 HC RX 258: Performed by: HEALTH CARE PROVIDER

## 2023-02-28 PROCEDURE — A9579 GAD-BASE MR CONTRAST NOS,1ML: HCPCS | Performed by: HEALTH CARE PROVIDER

## 2023-02-28 PROCEDURE — 72156 MRI NECK SPINE W/O & W/DYE: CPT

## 2023-02-28 PROCEDURE — 86140 C-REACTIVE PROTEIN: CPT

## 2023-02-28 PROCEDURE — 96375 TX/PRO/DX INJ NEW DRUG ADDON: CPT

## 2023-02-28 PROCEDURE — 36415 COLL VENOUS BLD VENIPUNCTURE: CPT

## 2023-02-28 PROCEDURE — 87804 INFLUENZA ASSAY W/OPTIC: CPT

## 2023-02-28 PROCEDURE — 87040 BLOOD CULTURE FOR BACTERIA: CPT

## 2023-02-28 PROCEDURE — 6360000004 HC RX CONTRAST MEDICATION: Performed by: HEALTH CARE PROVIDER

## 2023-02-28 PROCEDURE — 87086 URINE CULTURE/COLONY COUNT: CPT

## 2023-02-28 PROCEDURE — 6360000002 HC RX W HCPCS: Performed by: HEALTH CARE PROVIDER

## 2023-02-28 PROCEDURE — 85025 COMPLETE CBC W/AUTO DIFF WBC: CPT

## 2023-02-28 PROCEDURE — 72157 MRI CHEST SPINE W/O & W/DYE: CPT

## 2023-02-28 RX ORDER — ONDANSETRON 2 MG/ML
4 INJECTION INTRAMUSCULAR; INTRAVENOUS ONCE
Status: COMPLETED | OUTPATIENT
Start: 2023-02-28 | End: 2023-02-28

## 2023-02-28 RX ORDER — KETOROLAC TROMETHAMINE 30 MG/ML
30 INJECTION, SOLUTION INTRAMUSCULAR; INTRAVENOUS ONCE
Status: COMPLETED | OUTPATIENT
Start: 2023-02-28 | End: 2023-02-28

## 2023-02-28 RX ORDER — SODIUM CHLORIDE, SODIUM LACTATE, POTASSIUM CHLORIDE, AND CALCIUM CHLORIDE .6; .31; .03; .02 G/100ML; G/100ML; G/100ML; G/100ML
1000 INJECTION, SOLUTION INTRAVENOUS ONCE
Status: COMPLETED | OUTPATIENT
Start: 2023-02-28 | End: 2023-02-28

## 2023-02-28 RX ORDER — SODIUM CHLORIDE 0.9 % (FLUSH) 0.9 %
10 SYRINGE (ML) INJECTION PRN
Status: DISCONTINUED | OUTPATIENT
Start: 2023-02-28 | End: 2023-03-01 | Stop reason: HOSPADM

## 2023-02-28 RX ADMIN — ONDANSETRON 4 MG: 2 INJECTION INTRAMUSCULAR; INTRAVENOUS at 19:56

## 2023-02-28 RX ADMIN — KETOROLAC TROMETHAMINE 30 MG: 30 INJECTION, SOLUTION INTRAMUSCULAR; INTRAVENOUS at 19:56

## 2023-02-28 RX ADMIN — GADOTERIDOL 12 ML: 279.3 INJECTION, SOLUTION INTRAVENOUS at 23:21

## 2023-02-28 RX ADMIN — SODIUM CHLORIDE, PRESERVATIVE FREE 10 ML: 5 INJECTION INTRAVENOUS at 23:21

## 2023-02-28 RX ADMIN — SODIUM CHLORIDE, POTASSIUM CHLORIDE, SODIUM LACTATE AND CALCIUM CHLORIDE 1000 ML: 600; 310; 30; 20 INJECTION, SOLUTION INTRAVENOUS at 19:56

## 2023-02-28 ASSESSMENT — PAIN DESCRIPTION - LOCATION: LOCATION: BACK

## 2023-02-28 ASSESSMENT — PAIN - FUNCTIONAL ASSESSMENT: PAIN_FUNCTIONAL_ASSESSMENT: 0-10

## 2023-02-28 ASSESSMENT — PAIN DESCRIPTION - PAIN TYPE: TYPE: ACUTE PAIN

## 2023-02-28 ASSESSMENT — ENCOUNTER SYMPTOMS
SHORTNESS OF BREATH: 0
NAUSEA: 1
VOMITING: 1

## 2023-02-28 ASSESSMENT — PAIN DESCRIPTION - DESCRIPTORS: DESCRIPTORS: DISCOMFORT

## 2023-02-28 ASSESSMENT — PAIN DESCRIPTION - FREQUENCY: FREQUENCY: CONTINUOUS

## 2023-02-28 ASSESSMENT — PAIN SCALES - GENERAL: PAINLEVEL_OUTOF10: 6

## 2023-02-28 NOTE — Clinical Note
Salinas Bailey was seen and treated in our emergency department on 2/28/2023. He may return to work on 03/03/2023. If you have any questions or concerns, please don't hesitate to call.       Connie Marshall MD

## 2023-03-01 LAB — CRP SERPL HS-MCNC: 47.2 MG/L (ref 0–5)

## 2023-03-01 RX ORDER — ONDANSETRON 4 MG/1
4 TABLET, ORALLY DISINTEGRATING ORAL EVERY 8 HOURS PRN
Qty: 21 TABLET | Refills: 0 | Status: SHIPPED | OUTPATIENT
Start: 2023-03-01

## 2023-03-01 NOTE — ED PROVIDER NOTES
ne     [facility] ED  Emergency Department Encounter  Emergency Medicine Resident     Pt Heather Kan  MRN: [id number]  [place] 1975  Date of evaluation: 2/28/23  PCP:  No primary care provider on file. Note Started: 7:23 PM EST      CHIEF COMPLAINT       Chief Complaint   Patient presents with    Fever    Nausea    Emesis       HISTORY OF PRESENT ILLNESS  (Location/Symptom, Timing/Onset, Context/Setting, Quality, Duration, Modifying Factors, Severity.)      Chetna Stafford is a 52 y.o. male who presents with right lower extremity numbness, nausea, vomiting and fever. Patient states that he has been experiencing right lower extremity numbness and weakness with occasional gait instability for the past week. States that at times, his leg is completely numb. Patient comes in today due to spike of fever of 104 °F at home with multiple episodes of nausea and vomiting. Able to tolerate sips, but not much else. Additionally complains of mild dizziness and lightheadedness. He denies any headaches, changes, tinnitus, neck pain, chest pain, shortness of breath, abdominal pain, urinary retention, constipation, incontinence of bowel or bladder. He also denies any history of IV drug abuse, or use of any other substances. PAST MEDICAL / SURGICAL / SOCIAL / FAMILY HISTORY     Denies past medical history       has a past surgical history that includes Appendectomy.       Social History     Socioeconomic History    Marital status: Single     Spouse name: Not on file    Number of children: Not on file    Years of education: Not on file    Highest education level: Not on file   Occupational History    Not on file   Tobacco Use    Smoking status: Unknown    Smokeless tobacco: Not on file   Substance and Sexual Activity    Alcohol use: Never    Drug use: Never    Sexual activity: Not on file   Other Topics Concern    Not on file   Social History Narrative    Not on file     Social Determinants of Health     Financial Resource Strain: Not on file   Food Insecurity: Not on file   Transportation Needs: Not on file   Physical Activity: Not on file   Stress: Not on file   Social Connections: Not on file   Intimate Partner Violence: Not on file   Housing Stability: Not on file       History reviewed. No pertinent family history. Allergies:  Patient has no known allergies. Home Medications:  Prior to Admission medications    Medication Sig Start Date End Date Taking? Authorizing Provider   ondansetron (ZOFRAN-ODT) 4 MG disintegrating tablet Take 1 tablet by mouth every 8 hours as needed for Nausea or Vomiting 3/1/23  Yes Kath Liu MD   acetaminophen (TYLENOL) 325 MG tablet Take 2 tablets by mouth every 6 hours as needed for Pain 7/17/22 7/27/22  Ashley Payne DO   ibuprofen (ADVIL;MOTRIN) 600 MG tablet Take 1 tablet by mouth every 8 hours as needed for Pain 7/17/22 7/27/22  Ashley Payne DO         REVIEW OF SYSTEMS       Review of Systems   Constitutional:  Positive for chills and fever. HENT:  Negative for tinnitus. Eyes:  Negative for visual disturbance. Respiratory:  Negative for shortness of breath. Cardiovascular:  Negative for chest pain. Gastrointestinal:  Positive for nausea and vomiting. Musculoskeletal:  Positive for gait problem. PHYSICAL EXAM      INITIAL VITALS:   /79   Pulse 75   Temp (!) 101.1 °F (38.4 °C) (Oral)   Resp 18   Ht 5' 6\" (1.676 m)   Wt 140 lb (63.5 kg)   SpO2 94%   BMI 22.60 kg/m²     Physical Exam  Vitals reviewed. Constitutional:       General: He is in acute distress. Appearance: Normal appearance. He is not ill-appearing. HENT:      Head: Normocephalic and atraumatic. Right Ear: Tympanic membrane, ear canal and external ear normal.      Left Ear: Tympanic membrane, ear canal and external ear normal.      Nose: Nose normal. No rhinorrhea.       Mouth/Throat:      Mouth: Mucous membranes are moist.      Pharynx: Oropharynx is clear. Eyes:      Conjunctiva/sclera: Conjunctivae normal.      Pupils: Pupils are equal, round, and reactive to light. Cardiovascular:      Rate and Rhythm: Normal rate and regular rhythm. Pulses: Normal pulses. Heart sounds: Normal heart sounds. Pulmonary:      Effort: Pulmonary effort is normal.      Breath sounds: Normal breath sounds. Abdominal:      General: There is no distension. Palpations: Abdomen is soft. Tenderness: There is no abdominal tenderness. Musculoskeletal:      Cervical back: Normal range of motion and neck supple. Right lower leg: No edema. Left lower leg: No edema. Skin:     General: Skin is warm and dry. Capillary Refill: Capillary refill takes less than 2 seconds. Neurological:      General: No focal deficit present. Mental Status: He is alert and oriented to person, place, and time. Coordination: Coordination abnormal.      Comments: Mild decrease in sensation RLE, mild weakness in R great toe. Amublation stable with some favoring of the left. Psychiatric:         Mood and Affect: Mood normal.         Behavior: Behavior normal.         DDX/DIAGNOSTIC RESULTS / EMERGENCY DEPARTMENT COURSE / MDM     Medical Decision Making  14-year-old male with persistent right lower extremity numbness and weakness with fever, nausea and vomiting. This, in combination with back pain and radiculopathy is concerning for spinal epidural abscess. Will obtain CMP, CBC, blood cultures, lactate, ESR, CRP and MRI of the entire spine. Amount and/or Complexity of Data Reviewed  Independent Historian: spouse  Labs: ordered. Radiology: ordered. Risk  Prescription drug management. EMERGENCY DEPARTMENT COURSE:      ED Course as of 03/01/23 0131   Wed Mar 01, 2023   0129 MRIs are negative for spinal and epidural abscess, but significant for disc bulging in multiple areas. Now afebrile.   Nausea controlled with Zofran and able to tolerate food without difficulty. Will discharge home with instructions to follow-up with neurosurgery as an outpatient. Zofran as outpatient for nausea control at home. Discussed return precautions. Patient knowledge understanding and agreement with plan. [GG]      ED Course User Index  [GG] Kath Liu MD       PROCEDURES:  None    CONSULTS:  None    CRITICAL CARE:  None    FINAL IMPRESSION      1. Nausea and vomiting, unspecified vomiting type    2. Fever, unspecified    3. Right leg numbness    4. Bulging lumbar disc    5.  Bulging of cervical intervertebral disc without myelopathy          DISPOSITION / PLAN     DISPOSITION Decision To Discharge 03/01/2023 01:17:03 AM      PATIENT REFERRED TO:  [facility]  [address] [address]  [phone]        DISCHARGE MEDICATIONS:  Discharge Medication List as of 3/1/2023  1:15 AM        START taking these medications    Details   ondansetron (ZOFRAN-ODT) 4 MG disintegrating tablet Take 1 tablet by mouth every 8 hours as needed for Nausea or Vomiting, Disp-21 tablet, R-0Print             Kath Liu MD  Emergency Medicine Resident    (Please note that portions of thisnote were completed with a voice recognition program.  Efforts were made to edit the dictations but occasionally words are mis-transcribed.)       Kath Liu MD  Resident  03/01/23 0228

## 2023-03-01 NOTE — DISCHARGE INSTRUCTIONS
Seen in the emergency department for fever, nausea and vomiting, as well as right lower extremity numbness. Imaging of your spine revealed bulging disks in multiple areas, which may eventually need medical attention. Please follow-up with neurosurgery to discuss the results of your MRI and plan of care. Please take your medications exactly as prescribed. If you experience worsening fever, chills, abdominal pain, nausea, vomiting, diarrhea, weakness, numbness, tingling, loss of bowel/bladder control, inability urinate, or any other symptom you find concerning, please return to the emergency department immediately for reevaluation.

## 2023-03-01 NOTE — ED NOTES
Pt resting with eyes closed on stretcher, pt appears in NAD. Pt denies at this time, will continue to monitor.       Savage Lucio RN  02/28/23 2127

## 2023-03-01 NOTE — ED PROVIDER NOTES
[address] [facility]     Emergency Department     Faculty Attestation    I performed a history and physical examination of the patient and discussed management with the resident. I reviewed the residents note and agree with the documented findings and plan of care. Any areas of disagreement are noted on the chart. I was personally present for the key portions of any procedures. I have documented in the chart those procedures where I was not present during the key portions. I have reviewed the emergency nurses triage note. I agree with the chief complaint, past medical history, past surgical history, allergies, medications, social and family history as documented unless otherwise noted below. For Physician Assistant/ Nurse Practitioner cases/documentation I have personally evaluated this patient and have completed at least one if not all key elements of the E/M (history, physical exam, and MDM). Additional findings are as noted. I have personally seen and evaluated the patient. I find the patient's history and physical exam are consistent with the NP/PA documentation. I agree with the care provided, treatment rendered, disposition and follow-up plan. 19-year-old male presenting with fever, chills, low back pain, right leg weakness and numbness, and vomiting. Has been having the low back pain and right leg weakness for approximately 2 to 3 weeks. It has gradually been worsening. He has been having difficulty ambulating. He started having fevers today, and had multiple episodes of emesis. No one has been ill around him. No diarrhea. He did have an episode of urinary incontinence at home.     Exam:  General : Laying on the bed, awake, alert, and in no acute distress  CV : normal rate and regular rhythm  Lungs : Breathing comfortably on room air with no tachypnea, hypoxia, or increased work of breathing  Abdomen : soft, non-tender, non-distended  MSK/Neuro: Awake, alert, moving all extremities. 4/5 strength in the right dorsiflexors completed to the left dorsiflexors 5/5, 4+/5 strength in the right plantar flexors, 5 out of 5 in the left. Pain with hip flexion on the right. Sensation intact to light touch on the right foot. DDx: Spinal epidural abscess, viral illness, radiculopathy, discitis, osteomyelitis    Plan:  Patient does not have a source of infection, but has severe back pain, radiculopathy, and fever which is concerning for spinal epidural abscess. He denies any history of IVDA or injections. No recent dental infection, but has had a viral upper respiratory/ear infection in the last 2 to 3 weeks. No sick contacts. We will obtain septic work-up, call MRI for evaluation for spinal epidural abscess    Medical Decision Making  Amount and/or Complexity of Data Reviewed  Labs: ordered. Radiology: ordered. Risk  Prescription drug management.       Sara Lezama MD   Attending Emergency Physician    (Please note that portions of this note were completed with a voice recognition program. Efforts were made to edit the dictations but occasionally words are mis-transcribed.)            Sara Lezama MD  02/28/23 2019

## 2023-03-01 NOTE — ED PROVIDER NOTES
[address]   Emergency Department  Emergency Medicine Attending Sign-out     Care of Rahul Nguyen was assumed from previous attending Dr. Chika Silva and is being seen for Fever, Nausea, and Emesis  . The patient's initial evaluation and plan have been discussed with the prior provider who initially evaluated the patient. Attestation  I was available and discussed any additional care issues that arose and coordinated the management plans with the resident(s) caring for the patient during my duty period. Any areas of disagreement with resident's documentation of care or procedures are noted on the chart. I was personally present for the key portions of any/all procedures, during my duty period. I have documented in the chart those procedures where I was not present during the key portions. BRIEF PATIENT SUMMARY/MDM COURSE PER INITIAL PROVIDER:   RECENT VITALS:     Temp: (!) 101.1 °F (38.4 °C),  Heart Rate: 75, Resp: 18, BP: 110/79, SpO2: 94 %    This patient is a 52 y.o. Male with right lower extremity weakness and numbness has been going on for approximately month. Then developed a fever today. Also having tenderness in the back. Awaiting MRI.   If MRI negative plan for likely outpatient follow-up    DIAGNOSTICS/MEDICATIONS:     MEDICATIONS GIVEN:  ED Medication Orders (From admission, onward)      Start Ordered     Status Ordering Provider    02/28/23 2317 02/28/23 2317  sodium chloride flush 0.9 % injection 10 mL  PRN         Last MAR action: Given - by RAGHAV LONG on 02/28/23 at [place] [address],  [email or web address] [facility]    02/28/23 2317 02/28/23 2317  gadoteridol (PROHANCE) injection 12 mL  IMG ONCE PRN         Last MAR action: Given - by RAGHAV LONG on 02/28/23 at [place] [address],  [address]    02/28/23 1945 02/28/23 1938  ondansetron (ZOFRAN) injection 4 mg  ONCE         Last MAR action: Given - by Richie PINEDA on 02/28/23 at [address] [place] [address], [address]    02/28/23 1945 02/28/23 1938 lactated ringers bolus  ONCE         Last MAR action: Stopped - by MIRIAMAbena on 02/28/23 at [address]    02/28/23 1945 02/28/23 1944  ketorolac (TORADOL) injection 30 mg  ONCE         Last MAR action: Given - by Abena PINEDA on 02/28/23 at [address] [facility], [facility] [address] Reviewed   CBC WITH AUTO DIFFERENTIAL - Abnormal; Notable for the following components:       Result Value    RDW 11.5 (*)     Seg Neutrophils 81 (*)     Lymphocytes 7 (*)     Absolute Lymph # 0.73 (*)     All other components within normal limits   COMPREHENSIVE METABOLIC PANEL - Abnormal; Notable for the following components:    Glucose 120 (*)     BUN 22 (*)     All other components within normal limits   URINALYSIS WITH MICROSCOPIC - Abnormal; Notable for the following components:    Ketones, Urine TRACE (*)     Specific Gravity, UA 1.036 (*)     Protein, UA 1+ (*)     All other components within normal limits   C-REACTIVE PROTEIN - Abnormal; Notable for the following components:    CRP 47.2 (*)     All other components within normal limits   CULTURE, BLOOD 1   CULTURE, BLOOD 2   COVID-19, RAPID   RAPID INFLUENZA A/B ANTIGENS   CULTURE, URINE   LIPASE   LACTATE, SEPSIS   PROTIME-INR   SEDIMENTATION RATE       RADIOLOGY  MRI CERVICAL SPINE W WO CONTRAST    Result Date: 2/28/2023  EXAMINATION: MRI OF THE THORACIC SPINE WITHOUT AND WITH CONTRAST; MRI OF THE LUMBAR SPINE WITHOUT AND WITH CONTRAST; MRI OF THE CERVICAL SPINE WITHOUT AND WITH CONTRAST  2/28/2023 10:36 pm TECHNIQUE: Multiplanar multisequence MRI of the thoracic spine was performed without and with the administration of intravenous contrast.; Multiplanar multisequence MRI of the lumbar spine was performed without and with the administration of intravenous contrast.; Multiplanar multisequence MRI of the cervical spine was performed without and with the administration of intravenous contrast. COMPARISON: None HISTORY: ORDERING SYSTEM PROVIDED HISTORY: r/o spinal epidural abscess TECHNOLOGIST PROVIDED HISTORY: r/o spinal epidural abscess Decision Support Exception - unselect if not a suspected or confirmed emergency medical condition->Emergency Medical Condition (MA) Reason for Exam: r/o spinal epidural abscess FINDINGS: Cervical spine: BONES/ALIGNMENT: There is normal alignment of the spine. The vertebral body heights are maintained. The bone marrow signal appears unremarkable. No evidence of spondylo discitis. No epidural abscess. No abnormal enhancement. SPINAL CORD: No abnormal cord signal is seen. SOFT TISSUES: No paraspinal mass identified. DEGENERATIVE CHANGES: C2-C3: Unremarkable. C3-C4: Mild left neural foraminal narrowing. C4-C5: Unremarkable. C5-C6, grade 1 retrolisthesis. Mild bilateral facet arthropathy. Mild left neural foraminal narrowing. C6-C7, disc bulging with 2 mm central disc protrusion effaces the anterior thecal sac. Otherwise unremarkable C7-T1, grade 1 anterolisthesis. 3 mm left foraminal disc protrusion impinges on the exiting left C8 nerve root and results in moderate left neural foraminal narrowing Thoracic spine: BONES/ALIGNMENT: There is normal alignment of the spine. Chronic superior endplate compression fracture of T12 with 15% height loss. No retropulsion. Chronic anterior wedging of T3 with 20% height loss. No retropulsion. The vertebral body heights are otherwise maintained. The bone marrow signal appears unremarkable. No focus of abnormal enhancement is noted. No evidence of a spondylo discitis. No epidural abscess. SPINAL CORD: No abnormal cord signal is seen. SOFT TISSUES: No paraspinal mass identified. DEGENERATIVE CHANGES: No significant spinal canal stenosis or neural foraminal narrowing of the thoracic spine. At T11-T12, mild endplate degenerative marrow change. Subtle disc bulging effaces the anterior thecal sac. Otherwise unremarkable Lumbar spine: BONES/ALIGNMENT: There is normal alignment of the spine.  The vertebral body heights are maintained. The bone marrow signal appears unremarkable.  No spondylo discitis.  Abnormal epidural enhancement. SPINAL CORD: No abnormal cord signal is seen. SOFT TISSUES: No paraspinal mass identified. DEGENERATIVE CHANGES: T12-L1 and L1-L2: Unremarkable. L2-L3: Mild loss of disc signal.  Otherwise unremarkable L3-L4: Subtle disc bulging effaces the anterior thecal sac.  3 mm left foraminal disc protrusion.  Mild bilateral facet arthropathy.  Moderate left and mild right neural foraminal narrowing L4-L5: Subtle disc bulging.  Moderate bilateral facet arthropathy.  Moderate bilateral neural foraminal narrowing L5-S1, disc bulging.  Moderate bilateral facet arthropathy.  Moderate bilateral neural foraminal narrowing CERVICAL SPINE: No evidence of spondylo discitis. No epidural abscess. At C6-C7, central disc protrusion. At C7-T1, left foraminal disc protrusion impinges on the exiting left C8 nerve root and results in moderate left neural foraminal narrowing. Thoracic spine: No evidence of spondylo discitis.  No epidural abscess. Chronic superior endplate compression fracture of T12 with 15% height loss. No retropulsion. Chronic anterior wedging of T3 with 20% height loss. No retropulsion. Lumbar spine: No evidence of spondylo discitis. No epidural abscess.  No abnormal enhancement. At L3-L4, left foraminal disc protrusion and moderate left neural foraminal narrowing At L4-L5 and L5-S1, moderate bilateral neural foraminal narrowing     MRI THORACIC SPINE W WO CONTRAST    Result Date: 2/28/2023  EXAMINATION: MRI OF THE THORACIC SPINE WITHOUT AND WITH CONTRAST; MRI OF THE LUMBAR SPINE WITHOUT AND WITH CONTRAST; MRI OF THE CERVICAL SPINE WITHOUT AND WITH CONTRAST  2/28/2023 10:36 pm TECHNIQUE: Multiplanar multisequence MRI of the thoracic spine was performed without and with the administration of intravenous contrast.; Multiplanar multisequence MRI of the lumbar spine was performed without and with the  administration of intravenous contrast.; Multiplanar multisequence MRI of the cervical spine was performed without and with the administration of intravenous contrast. COMPARISON: None HISTORY: ORDERING SYSTEM PROVIDED HISTORY: r/o spinal epidural abscess TECHNOLOGIST PROVIDED HISTORY: r/o spinal epidural abscess Decision Support Exception - unselect if not a suspected or confirmed emergency medical condition->Emergency Medical Condition (MA) Reason for Exam: r/o spinal epidural abscess FINDINGS: Cervical spine: BONES/ALIGNMENT: There is normal alignment of the spine. The vertebral body heights are maintained. The bone marrow signal appears unremarkable. No evidence of spondylo discitis. No epidural abscess. No abnormal enhancement. SPINAL CORD: No abnormal cord signal is seen. SOFT TISSUES: No paraspinal mass identified. DEGENERATIVE CHANGES: C2-C3: Unremarkable. C3-C4: Mild left neural foraminal narrowing. C4-C5: Unremarkable. C5-C6, grade 1 retrolisthesis. Mild bilateral facet arthropathy. Mild left neural foraminal narrowing. C6-C7, disc bulging with 2 mm central disc protrusion effaces the anterior thecal sac. Otherwise unremarkable C7-T1, grade 1 anterolisthesis. 3 mm left foraminal disc protrusion impinges on the exiting left C8 nerve root and results in moderate left neural foraminal narrowing Thoracic spine: BONES/ALIGNMENT: There is normal alignment of the spine. Chronic superior endplate compression fracture of T12 with 15% height loss. No retropulsion. Chronic anterior wedging of T3 with 20% height loss. No retropulsion. The vertebral body heights are otherwise maintained. The bone marrow signal appears unremarkable. No focus of abnormal enhancement is noted. No evidence of a spondylo discitis. No epidural abscess. SPINAL CORD: No abnormal cord signal is seen. SOFT TISSUES: No paraspinal mass identified.  DEGENERATIVE CHANGES: No significant spinal canal stenosis or neural foraminal narrowing of the thoracic spine. At T11-T12, mild endplate degenerative marrow change. Subtle disc bulging effaces the anterior thecal sac. Otherwise unremarkable Lumbar spine: BONES/ALIGNMENT: There is normal alignment of the spine. The vertebral body heights are maintained. The bone marrow signal appears unremarkable. No spondylo discitis. Abnormal epidural enhancement. SPINAL CORD: No abnormal cord signal is seen. SOFT TISSUES: No paraspinal mass identified. DEGENERATIVE CHANGES: T12-L1 and L1-L2: Unremarkable. L2-L3: Mild loss of disc signal.  Otherwise unremarkable L3-L4: Subtle disc bulging effaces the anterior thecal sac.  3 mm left foraminal disc protrusion. Mild bilateral facet arthropathy. Moderate left and mild right neural foraminal narrowing L4-L5: Subtle disc bulging. Moderate bilateral facet arthropathy. Moderate bilateral neural foraminal narrowing L5-S1, disc bulging. Moderate bilateral facet arthropathy. Moderate bilateral neural foraminal narrowing CERVICAL SPINE: No evidence of spondylo discitis. No epidural abscess. At C6-C7, central disc protrusion. At C7-T1, left foraminal disc protrusion impinges on the exiting left C8 nerve root and results in moderate left neural foraminal narrowing. Thoracic spine: No evidence of spondylo discitis. No epidural abscess. Chronic superior endplate compression fracture of T12 with 15% height loss. No retropulsion. Chronic anterior wedging of T3 with 20% height loss. No retropulsion. Lumbar spine: No evidence of spondylo discitis. No epidural abscess. No abnormal enhancement.  At L3-L4, left foraminal disc protrusion and moderate left neural foraminal narrowing At L4-L5 and L5-S1, moderate bilateral neural foraminal narrowing     MRI LUMBAR SPINE W WO CONTRAST    Result Date: 2/28/2023  EXAMINATION: MRI OF THE THORACIC SPINE WITHOUT AND WITH CONTRAST; MRI OF THE LUMBAR SPINE WITHOUT AND WITH CONTRAST; MRI OF THE CERVICAL SPINE WITHOUT AND WITH CONTRAST 2/28/2023 10:36 pm TECHNIQUE: Multiplanar multisequence MRI of the thoracic spine was performed without and with the administration of intravenous contrast.; Multiplanar multisequence MRI of the lumbar spine was performed without and with the administration of intravenous contrast.; Multiplanar multisequence MRI of the cervical spine was performed without and with the administration of intravenous contrast. COMPARISON: None HISTORY: ORDERING SYSTEM PROVIDED HISTORY: r/o spinal epidural abscess TECHNOLOGIST PROVIDED HISTORY: r/o spinal epidural abscess Decision Support Exception - unselect if not a suspected or confirmed emergency medical condition->Emergency Medical Condition (MA) Reason for Exam: r/o spinal epidural abscess FINDINGS: Cervical spine: BONES/ALIGNMENT: There is normal alignment of the spine. The vertebral body heights are maintained. The bone marrow signal appears unremarkable.No evidence of spondylo discitis. No epidural abscess.  No abnormal enhancement. SPINAL CORD: No abnormal cord signal is seen. SOFT TISSUES: No paraspinal mass identified. DEGENERATIVE CHANGES: C2-C3: Unremarkable. C3-C4: Mild left neural foraminal narrowing. C4-C5: Unremarkable. C5-C6, grade 1 retrolisthesis.  Mild bilateral facet arthropathy.  Mild left neural foraminal narrowing. C6-C7, disc bulging with 2 mm central disc protrusion effaces the anterior thecal sac.  Otherwise unremarkable C7-T1, grade 1 anterolisthesis.  3 mm left foraminal disc protrusion impinges on the exiting left C8 nerve root and results in moderate left neural foraminal narrowing Thoracic spine: BONES/ALIGNMENT: There is normal alignment of the spine.  Chronic superior endplate compression fracture of T12 with 15% height loss.  No retropulsion. Chronic anterior wedging of T3 with 20% height loss.  No retropulsion.  The vertebral body heights are otherwise maintained.  The bone marrow signal appears unremarkable.  No focus of abnormal enhancement is  noted. No evidence of a spondylo discitis. No epidural abscess. SPINAL CORD: No abnormal cord signal is seen. SOFT TISSUES: No paraspinal mass identified. DEGENERATIVE CHANGES: No significant spinal canal stenosis or neural foraminal narrowing of the thoracic spine. At T11-T12, mild endplate degenerative marrow change. Subtle disc bulging effaces the anterior thecal sac. Otherwise unremarkable Lumbar spine: BONES/ALIGNMENT: There is normal alignment of the spine. The vertebral body heights are maintained. The bone marrow signal appears unremarkable. No spondylo discitis. Abnormal epidural enhancement. SPINAL CORD: No abnormal cord signal is seen. SOFT TISSUES: No paraspinal mass identified. DEGENERATIVE CHANGES: T12-L1 and L1-L2: Unremarkable. L2-L3: Mild loss of disc signal.  Otherwise unremarkable L3-L4: Subtle disc bulging effaces the anterior thecal sac.  3 mm left foraminal disc protrusion. Mild bilateral facet arthropathy. Moderate left and mild right neural foraminal narrowing L4-L5: Subtle disc bulging. Moderate bilateral facet arthropathy. Moderate bilateral neural foraminal narrowing L5-S1, disc bulging. Moderate bilateral facet arthropathy. Moderate bilateral neural foraminal narrowing CERVICAL SPINE: No evidence of spondylo discitis. No epidural abscess. At C6-C7, central disc protrusion. At C7-T1, left foraminal disc protrusion impinges on the exiting left C8 nerve root and results in moderate left neural foraminal narrowing. Thoracic spine: No evidence of spondylo discitis. No epidural abscess. Chronic superior endplate compression fracture of T12 with 15% height loss. No retropulsion. Chronic anterior wedging of T3 with 20% height loss. No retropulsion. Lumbar spine: No evidence of spondylo discitis. No epidural abscess. No abnormal enhancement.  At L3-L4, left foraminal disc protrusion and moderate left neural foraminal narrowing At L4-L5 and L5-S1, moderate bilateral neural foraminal narrowing       OUTSTANDING TASKS / ADDITIONAL COMMENTS   MRI does not show any signs of epidural abscess. Does have disc bulging and some foraminal narrowing. Patient feeling improved.   Okay for outpatient follow-up    Hayder Warren MD  Emergency Medicine Attending  [facility] Wor[facility] MD  03/01/23 2026

## 2023-03-02 LAB
MICROORGANISM SPEC CULT: NORMAL
SPECIMEN DESCRIPTION: NORMAL

## 2023-03-05 LAB
MICROORGANISM SPEC CULT: NORMAL
MICROORGANISM SPEC CULT: NORMAL
SERVICE CMNT-IMP: NORMAL
SERVICE CMNT-IMP: NORMAL
SPECIMEN DESCRIPTION: NORMAL
SPECIMEN DESCRIPTION: NORMAL

## 2023-03-20 ENCOUNTER — TELEPHONE (OUTPATIENT)
Dept: INTERNAL MEDICINE | Age: 48
End: 2023-03-20

## 2023-03-20 NOTE — TELEPHONE ENCOUNTER
Called patient to reschedule no show appointment on 3/15/23, patient states his wife will call back in to get him rescheduled.

## 2023-06-01 ENCOUNTER — APPOINTMENT (OUTPATIENT)
Dept: CT IMAGING | Age: 48
End: 2023-06-01
Payer: COMMERCIAL

## 2023-06-01 ENCOUNTER — HOSPITAL ENCOUNTER (EMERGENCY)
Age: 48
Discharge: HOME | End: 2023-06-01
Attending: EMERGENCY MEDICINE
Payer: COMMERCIAL

## 2023-06-01 VITALS
TEMPERATURE: 97.9 F | DIASTOLIC BLOOD PRESSURE: 81 MMHG | OXYGEN SATURATION: 96 % | HEART RATE: 71 BPM | SYSTOLIC BLOOD PRESSURE: 132 MMHG

## 2023-06-01 DIAGNOSIS — K40.90 RIGHT INGUINAL HERNIA: Primary | ICD-10-CM

## 2023-06-01 LAB
ALBUMIN SERPL-MCNC: 4.5 G/DL (ref 3.5–5.2)
ALBUMIN/GLOB SERPL: 1.7 {RATIO} (ref 1–2.5)
ALP SERPL-CCNC: 73 U/L (ref 40–129)
ALT SERPL-CCNC: 20 U/L (ref 5–41)
ANION GAP SERPL CALCULATED.3IONS-SCNC: 12 MMOL/L (ref 9–17)
AST SERPL-CCNC: 21 U/L
BASOPHILS # BLD: 0.11 K/UL (ref 0–0.2)
BASOPHILS NFR BLD: 1 % (ref 0–2)
BILIRUB SERPL-MCNC: 0.5 MG/DL (ref 0.3–1.2)
BUN SERPL-MCNC: 22 MG/DL (ref 6–20)
CALCIUM SERPL-MCNC: 9.7 MG/DL (ref 8.6–10.4)
CHLORIDE SERPL-SCNC: 102 MMOL/L (ref 98–107)
CO2 SERPL-SCNC: 25 MMOL/L (ref 20–31)
CREAT SERPL-MCNC: 0.91 MG/DL (ref 0.7–1.2)
EOSINOPHIL # BLD: 0.13 K/UL (ref 0–0.44)
EOSINOPHILS RELATIVE PERCENT: 2 % (ref 1–4)
ERYTHROCYTE [DISTWIDTH] IN BLOOD BY AUTOMATED COUNT: 11.9 % (ref 11.8–14.4)
GFR SERPL CREATININE-BSD FRML MDRD: >60 ML/MIN/1.73M2
GLUCOSE SERPL-MCNC: 113 MG/DL (ref 70–99)
HCT VFR BLD AUTO: 44.5 % (ref 40.7–50.3)
HGB BLD-MCNC: 15.3 G/DL (ref 13–17)
IMM GRANULOCYTES # BLD AUTO: <0.03 K/UL (ref 0–0.3)
IMM GRANULOCYTES NFR BLD: 0 %
LACTIC ACID, WHOLE BLOOD: 1.3 MMOL/L (ref 0.7–2.1)
LYMPHOCYTES # BLD: 21 % (ref 24–43)
LYMPHOCYTES NFR BLD: 1.78 K/UL (ref 1.1–3.7)
MCH RBC QN AUTO: 30.8 PG (ref 25.2–33.5)
MCHC RBC AUTO-ENTMCNC: 34.4 G/DL (ref 28.4–34.8)
MCV RBC AUTO: 89.7 FL (ref 82.6–102.9)
MONOCYTES NFR BLD: 0.85 K/UL (ref 0.1–1.2)
MONOCYTES NFR BLD: 10 % (ref 3–12)
NEUTROPHILS NFR BLD: 66 % (ref 36–65)
NEUTS SEG NFR BLD: 5.65 K/UL (ref 1.5–8.1)
NRBC AUTOMATED: 0 PER 100 WBC
PLATELET # BLD AUTO: 251 K/UL (ref 138–453)
PMV BLD AUTO: 9.4 FL (ref 8.1–13.5)
POTASSIUM SERPL-SCNC: 3.7 MMOL/L (ref 3.7–5.3)
PROT SERPL-MCNC: 7.1 G/DL (ref 6.4–8.3)
RBC # BLD AUTO: 4.96 M/UL (ref 4.21–5.77)
SODIUM SERPL-SCNC: 139 MMOL/L (ref 135–144)
WBC OTHER # BLD: 8.5 K/UL (ref 3.5–11.3)

## 2023-06-01 PROCEDURE — 83605 ASSAY OF LACTIC ACID: CPT

## 2023-06-01 PROCEDURE — 74177 CT ABD & PELVIS W/CONTRAST: CPT

## 2023-06-01 PROCEDURE — 85027 COMPLETE CBC AUTOMATED: CPT

## 2023-06-01 PROCEDURE — 6360000002 HC RX W HCPCS: Performed by: STUDENT IN AN ORGANIZED HEALTH CARE EDUCATION/TRAINING PROGRAM

## 2023-06-01 PROCEDURE — 99285 EMERGENCY DEPT VISIT HI MDM: CPT

## 2023-06-01 PROCEDURE — 6360000004 HC RX CONTRAST MEDICATION: Performed by: STUDENT IN AN ORGANIZED HEALTH CARE EDUCATION/TRAINING PROGRAM

## 2023-06-01 PROCEDURE — 80053 COMPREHEN METABOLIC PANEL: CPT

## 2023-06-01 RX ORDER — ACETAMINOPHEN 325 MG/1
325 TABLET ORAL EVERY 6 HOURS PRN
Qty: 28 TABLET | Refills: 0 | Status: SHIPPED | OUTPATIENT
Start: 2023-06-01 | End: 2023-06-08

## 2023-06-01 RX ORDER — MIDAZOLAM HYDROCHLORIDE 2 MG/2ML
1 INJECTION, SOLUTION INTRAMUSCULAR; INTRAVENOUS ONCE
Status: COMPLETED | OUTPATIENT
Start: 2023-06-01 | End: 2023-06-01

## 2023-06-01 RX ADMIN — MIDAZOLAM 1 MG: 1 INJECTION INTRAMUSCULAR; INTRAVENOUS at 19:15

## 2023-06-01 RX ADMIN — IOPAMIDOL 75 ML: 755 INJECTION, SOLUTION INTRAVENOUS at 20:58

## 2023-06-01 ASSESSMENT — ENCOUNTER SYMPTOMS
COLOR CHANGE: 0
NAUSEA: 1
DIARRHEA: 0
VOMITING: 0
ABDOMINAL PAIN: 0
SHORTNESS OF BREATH: 0
CONSTIPATION: 0

## 2023-06-01 NOTE — ED NOTES
The following labs were labeled with appropriate pt sticker and tubed to lab:     [] Blue     [x] Lavender   [] on ice  [x] Green/yellow  [x] Green/black [x] on ice  [] Gualberto Fritz  [] on ice  [] Yellow  [] Red  [] Type/ Screen  [] ABG  [] VBG    [] COVID-19 swab    [] Rapid  [] PCR  [] Flu swab  [] Peds Viral Panel     [] Urine Sample  [] Fecal Sample  [] Pelvic Cultures  [] Blood Cultures  [] X 2  [] STREP Cultures      Martha Salazar LPN  71/29/04 8018

## 2023-06-01 NOTE — ED PROVIDER NOTES
[facility]     Emergency Department     Faculty Attestation  6:44 PM EDT      I performed a history and physical examination of the patient and discussed management with the resident. I have reviewed and agree with the residents findings including all diagnostic interpretations, and treatment plans as written. Any areas of disagreement are noted on the chart. I was personally present for the key portions of any procedures. I have documented in the chart those procedures where I was not present during the key portions. I have reviewed the emergency nurses triage note. I agree with the chief complaint, past medical history, past surgical history, allergies, medications, social and family history as documented unless otherwise noted below. Documentation of the HPI, Physical Exam and Medical Decision Making performed by thelma is based on my personal performance of the HPI, PE and MDM. For Physician Assistant/ Nurse Practitioner cases/documentation I have personally evaluated this patient and have completed at least one if not all key elements of the E/M (history, physical exam, and MDM). Additional findings are as noted. R sided inguinal hernia, patient has had it for the past 3 week, still tolerating po, no constipation, does heavy lifting at work, and causing worsening pain, and patient starting to feel nauseated.   Soft abdomen on exam R inguinal hernia, does not extend into scrotum, tender to palpation but soft    Plan for imaging, and referral to gen surg, and plan for reduction           William Blount D.O, M.P.H  Attending Emergency Medicine Physician        William Blount,   06/01/23 4430

## 2023-06-01 NOTE — ED PROVIDER NOTES
[address] ED  Emergency Department Encounter  Emergency Medicine Resident     Pt Gisela Vasquez  MRN: [id number]  [place] 1975  Date of evaluation: 6/1/23  PCP:  No primary care provider on file. Note Started: 6:35 PM EDT      CHIEF COMPLAINT       Chief Complaint   Patient presents with    Inguinal Hernia       HISTORY OF PRESENT ILLNESS  (Location/Symptom, Timing/Onset, Context/Setting, Quality, Duration, Modifying Factors, Severity.)      Kayla Pang is a 52 y.o. male who presents with right inguinal hernia of 3 weeks duration. Patient states this has been getting progressively bigger and for started when he was lifting heavy objects at his job. No bowel or bladder changes. Reports some nausea w/o vomiting. No overlying skin changes. No associated fevers or chills. PAST MEDICAL / SURGICAL / SOCIAL / FAMILY HISTORY      has no past medical history on file. has a past surgical history that includes Appendectomy. Social History     Socioeconomic History    Marital status: Single     Spouse name: Not on file    Number of children: Not on file    Years of education: Not on file    Highest education level: Not on file   Occupational History    Not on file   Tobacco Use    Smoking status: Unknown    Smokeless tobacco: Not on file   Substance and Sexual Activity    Alcohol use: Never    Drug use: Never    Sexual activity: Not on file   Other Topics Concern    Not on file   Social History Narrative    Not on file     Social Determinants of Health     Financial Resource Strain: Not on file   Food Insecurity: Not on file   Transportation Needs: Not on file   Physical Activity: Not on file   Stress: Not on file   Social Connections: Not on file   Intimate Partner Violence: Not on file   Housing Stability: Not on file       No family history on file. Allergies:  Patient has no known allergies.     Home Medications:  Prior to Admission medications    Medication Sig Start

## 2023-06-01 NOTE — ED TRIAGE NOTES
Pt brought to ED by self. Pt states that he noticed pain and a protrusion on the right side of his groin 3 weeks ago after lifting at work. Pt is concerned for hernia. Pt states that the pain has increased over the past couple weeks. Pt alert and oriented, ambulatory, resting in stretcher.

## 2023-06-02 NOTE — DISCHARGE INSTRUCTIONS
Thank you for visiting [address] [facility] Emergency Department. You need to call the [facility] surgery clinic and [facility] to make an appointment as directed for follow up. Should you have any questions regarding your care or further treatment, please call [facility] Emergency Department at [phone]. Return to emergency department for any new or worrisome symptoms including any numbness, weakness, tingling, fever, increasing pain, redness, swelling.

## 2023-06-02 NOTE — ED PROVIDER NOTES
[address] ED  Emergency Department  Emergency Medicine Resident Sign-out   Care of Adams Powers was assumed from Dr. Milus Cooks and is being seen for Inguinal Hernia  . The patient's initial evaluation and plan have been discussed with the prior provider who initially evaluated the patient. EMERGENCY DEPARTMENT COURSE / MEDICAL DECISION MAKING:       MEDICATIONS GIVEN:  Orders Placed This Encounter   Medications    midazolam PF (VERSED) injection 1 mg    iopamidol (ISOVUE-370) 76 % injection 75 mL    acetaminophen (TYLENOL) 325 MG tablet     Sig: Take 1 tablet by mouth every 6 hours as needed for Pain     Dispense:  28 tablet     Refill:  0       LABS / RADIOLOGY:     Labs Reviewed   CBC WITH AUTO DIFFERENTIAL - Abnormal; Notable for the following components:       Result Value    Seg Neutrophils 66 (*)     Lymphocytes 21 (*)     All other components within normal limits   COMPREHENSIVE METABOLIC PANEL - Abnormal; Notable for the following components:    Glucose 113 (*)     BUN 22 (*)     All other components within normal limits   LACTIC ACID       No results found. RECENT VITALS:     Temp: 97.9 °F (36.6 °C),  Pulse: 71,  , BP: 132/81, SpO2: 96 %      This patient is a 52 y.o. Male with c/o groin swelling x 3 weeks noticed when lifting furniture, gradually increasing in size. No skin changes. Reduced at bedside but then bulge back out. Labs pending, ct pending. CT with evidence of hernia, fat containing. Pt lactic wnl. Pt instructed to f[facility] and return precautions discussed including any redness, swelling, increasing pain. ED Course as of 06/01/23 2237   Thu Jun 01, 2023   1901 Hernia did manually reduce at bedside but reoccurred after sitting the patient up.  CT pending, labs pending [LR]   1956 WBC: 8.5 [LR]   1956 Hemoglobin Quant: 15.3 [LR]   1956 Glucose, Random(!): 113 [LR]   1956 BUN,BUNPL(!): 22 [LR]   1956 Creatinine: 0.91 [LR]   1956 Sodium: 139

## 2023-06-07 ENCOUNTER — OFFICE VISIT (OUTPATIENT)
Dept: SURGERY | Age: 48
End: 2023-06-07
Payer: COMMERCIAL

## 2023-06-07 VITALS
BODY MASS INDEX: 22.53 KG/M2 | HEIGHT: 66 IN | SYSTOLIC BLOOD PRESSURE: 107 MMHG | DIASTOLIC BLOOD PRESSURE: 68 MMHG | WEIGHT: 140.2 LBS | HEART RATE: 64 BPM

## 2023-06-07 DIAGNOSIS — K40.90 RIGHT INGUINAL HERNIA: Primary | ICD-10-CM

## 2023-06-07 DIAGNOSIS — L08.9 SUPERFICIAL SKIN INFECTION: ICD-10-CM

## 2023-06-07 PROCEDURE — 99202 OFFICE O/P NEW SF 15 MIN: CPT

## 2023-06-07 PROCEDURE — G8420 CALC BMI NORM PARAMETERS: HCPCS

## 2023-06-07 PROCEDURE — 4004F PT TOBACCO SCREEN RCVD TLK: CPT

## 2023-06-07 PROCEDURE — G8427 DOCREV CUR MEDS BY ELIG CLIN: HCPCS

## 2023-06-07 RX ORDER — CEPHALEXIN 500 MG/1
500 CAPSULE ORAL 4 TIMES DAILY
Qty: 28 CAPSULE | Refills: 0 | Status: SHIPPED | OUTPATIENT
Start: 2023-06-07

## 2023-06-07 NOTE — PROGRESS NOTES
Visit Information    Have you changed or started any medications since your last visit including any over-the-counter medicines, vitamins, or herbal medicines? no   Have you stopped taking any of your medications? Is so, why? -  no  Are you having any side effects from any of your medications? - no    Have you seen any other physician or provider since your last visit?  no   Have you had any other diagnostic tests since your last visit? Yes - labs, CT  Have you been seen in the emergency room and/or had an admission in a hospital since we last saw you?  yes - Katrina Salbador   Have you had your routine dental cleaning in the past 6 months?  no     Do you have an active MyChart account? If no, what is the barrier?   No: Pending    No care team member to display    Medical History Review  Past Medical, Family, and Social History reviewed and does not contribute to the patient presenting condition    Health Maintenance   Topic Date Due    COVID-19 Vaccine (1) Never done    DTaP/Tdap/Td vaccine (1 - Tdap) Never done    Depression Screen  07/29/2023    Flu vaccine (Season Ended) 08/01/2023    Colorectal Cancer Screen  08/07/2025    Lipids  07/29/2027    Hepatitis C screen  Completed    HIV screen  Completed    Hepatitis A vaccine  Aged Out    Hib vaccine  Aged Out    Meningococcal (ACWY) vaccine  Aged Out    Pneumococcal 0-64 years Vaccine  Aged Out

## 2023-06-07 NOTE — PATIENT INSTRUCTIONS
Thank you letting us take care of you today. We hope that all your questions were addressed. If a question was overlooked or something else comes to mind after you return home, please call our office at [phone]. If you need to cancel or change an appointment, surgery or procedure, please contact the office at [phone].

## 2023-06-07 NOTE — PROGRESS NOTES
History and Physical  [facility]    Patient's Name/Date of Birth: Carmen Amezquita / 1975 (4 y.o.)    Date: June 7, 2023     HPI: Pt is a 52 y.o. male who presents to [facility] for evaluation of a right inguinal hernia. Pt reports that he was lifting something heavy while at work about 4 weeks ago when he had an acute onset of pain in his right groin. Pt was seen in the emergency department on 6/1/23 with complaints of right groin pain and swelling. CT scan was performed which revealed a 2.7x2.4 fat containing right inguinal hernia. All labs findings were within normal limits. Pt was referred to [facility] surgery clinic for further evaluation and treatment. Pt states that the groin bulge is still present, however he is able to reduce it on his own. Pt denies nausea, vomiting, fever, chills, change in bowel habits. Pt does reports skin redness in the right groin. Upon evaluation, pt does appear to have a superficial skin infection in the right groin crease that is unlikely to be related to underlying hernia pathology. Patient has surgical history significant for open appendectomy. Pt states that he occasionally smokes. Discussed option for elective inguinal hernia repair in the near future, however, any local skin infections would need to be resolved prior to surgery. Discussed plan for one week of antibiotic therapy and reevaluation in one week for further discussion of surgical planning. Pt expressed understanding and agreement with the plan as discussed. No past medical history on file.     Past Surgical History:   Procedure Laterality Date    APPENDECTOMY         Current Outpatient Medications   Medication Sig Dispense Refill    cephALEXin (KEFLEX) 500 MG capsule Take 1 capsule by mouth 4 times daily 28 capsule 0    acetaminophen (TYLENOL) 325 MG tablet Take 1 tablet by mouth every 6 hours as needed for Pain 28 tablet 0    ondansetron (ZOFRAN-ODT) 4 MG disintegrating tablet Take 1 tablet

## 2023-06-14 ENCOUNTER — OFFICE VISIT (OUTPATIENT)
Dept: SURGERY | Age: 48
End: 2023-06-14
Payer: COMMERCIAL

## 2023-06-14 VITALS
WEIGHT: 144.2 LBS | HEIGHT: 66 IN | SYSTOLIC BLOOD PRESSURE: 103 MMHG | BODY MASS INDEX: 23.18 KG/M2 | HEART RATE: 69 BPM | DIASTOLIC BLOOD PRESSURE: 65 MMHG

## 2023-06-14 DIAGNOSIS — K40.90 RIGHT INGUINAL HERNIA: Primary | ICD-10-CM

## 2023-06-14 PROCEDURE — G8427 DOCREV CUR MEDS BY ELIG CLIN: HCPCS | Performed by: SURGERY

## 2023-06-14 PROCEDURE — G8420 CALC BMI NORM PARAMETERS: HCPCS | Performed by: SURGERY

## 2023-06-14 PROCEDURE — 99213 OFFICE O/P EST LOW 20 MIN: CPT | Performed by: SURGERY

## 2023-06-14 PROCEDURE — 4004F PT TOBACCO SCREEN RCVD TLK: CPT | Performed by: SURGERY

## 2023-07-06 ENCOUNTER — ANESTHESIA EVENT (OUTPATIENT)
Dept: OPERATING ROOM | Age: 48
End: 2023-07-06
Payer: COMMERCIAL

## 2023-07-06 RX ORDER — SODIUM CHLORIDE 9 MG/ML
INJECTION, SOLUTION INTRAVENOUS PRN
Status: CANCELLED | OUTPATIENT
Start: 2023-07-06

## 2023-07-06 RX ORDER — SODIUM CHLORIDE 0.9 % (FLUSH) 0.9 %
5-40 SYRINGE (ML) INJECTION EVERY 12 HOURS SCHEDULED
Status: CANCELLED | OUTPATIENT
Start: 2023-07-06

## 2023-07-06 RX ORDER — SODIUM CHLORIDE 0.9 % (FLUSH) 0.9 %
5-40 SYRINGE (ML) INJECTION PRN
Status: CANCELLED | OUTPATIENT
Start: 2023-07-06

## 2023-07-06 RX ORDER — SODIUM CHLORIDE, SODIUM LACTATE, POTASSIUM CHLORIDE, CALCIUM CHLORIDE 600; 310; 30; 20 MG/100ML; MG/100ML; MG/100ML; MG/100ML
INJECTION, SOLUTION INTRAVENOUS CONTINUOUS
Status: CANCELLED | OUTPATIENT
Start: 2023-07-06

## 2023-07-07 ENCOUNTER — HOSPITAL ENCOUNTER (OUTPATIENT)
Age: 48
Setting detail: OUTPATIENT SURGERY
Discharge: HOME | End: 2023-07-07
Attending: SURGERY | Admitting: SURGERY
Payer: COMMERCIAL

## 2023-07-07 ENCOUNTER — ANESTHESIA (OUTPATIENT)
Dept: OPERATING ROOM | Age: 48
End: 2023-07-07
Payer: COMMERCIAL

## 2023-07-07 VITALS
BODY MASS INDEX: 23.14 KG/M2 | SYSTOLIC BLOOD PRESSURE: 122 MMHG | RESPIRATION RATE: 23 BRPM | HEART RATE: 76 BPM | TEMPERATURE: 97.5 F | OXYGEN SATURATION: 91 % | HEIGHT: 66 IN | WEIGHT: 144 LBS | DIASTOLIC BLOOD PRESSURE: 98 MMHG

## 2023-07-07 DIAGNOSIS — G89.18 POST-OP PAIN: Primary | ICD-10-CM

## 2023-07-07 PROCEDURE — 2709999900 HC NON-CHARGEABLE SUPPLY: Performed by: SURGERY

## 2023-07-07 PROCEDURE — C1781 MESH (IMPLANTABLE): HCPCS | Performed by: SURGERY

## 2023-07-07 PROCEDURE — 3700000001 HC ADD 15 MINUTES (ANESTHESIA): Performed by: SURGERY

## 2023-07-07 PROCEDURE — 7100000010 HC PHASE II RECOVERY - FIRST 15 MIN: Performed by: SURGERY

## 2023-07-07 PROCEDURE — 6370000000 HC RX 637 (ALT 250 FOR IP)

## 2023-07-07 PROCEDURE — 3700000000 HC ANESTHESIA ATTENDED CARE: Performed by: SURGERY

## 2023-07-07 PROCEDURE — S2900 ROBOTIC SURGICAL SYSTEM: HCPCS | Performed by: SURGERY

## 2023-07-07 PROCEDURE — 6360000002 HC RX W HCPCS: Performed by: SURGERY

## 2023-07-07 PROCEDURE — 6360000002 HC RX W HCPCS: Performed by: NURSE ANESTHETIST, CERTIFIED REGISTERED

## 2023-07-07 PROCEDURE — 3600000019 HC SURGERY ROBOT ADDTL 15MIN: Performed by: SURGERY

## 2023-07-07 PROCEDURE — 6360000002 HC RX W HCPCS

## 2023-07-07 PROCEDURE — 7100000001 HC PACU RECOVERY - ADDTL 15 MIN: Performed by: SURGERY

## 2023-07-07 PROCEDURE — 7100000011 HC PHASE II RECOVERY - ADDTL 15 MIN: Performed by: SURGERY

## 2023-07-07 PROCEDURE — 2500000003 HC RX 250 WO HCPCS: Performed by: NURSE ANESTHETIST, CERTIFIED REGISTERED

## 2023-07-07 PROCEDURE — 3600000009 HC SURGERY ROBOT BASE: Performed by: SURGERY

## 2023-07-07 PROCEDURE — 7100000000 HC PACU RECOVERY - FIRST 15 MIN: Performed by: SURGERY

## 2023-07-07 PROCEDURE — 2580000003 HC RX 258: Performed by: NURSE ANESTHETIST, CERTIFIED REGISTERED

## 2023-07-07 DEVICE — MESH SURG W3.5XL6IN POLY SELF FIXATING RECT W/ RESRB PLA: Type: IMPLANTABLE DEVICE | Site: ABDOMEN | Status: FUNCTIONAL

## 2023-07-07 RX ORDER — ROCURONIUM BROMIDE 10 MG/ML
INJECTION, SOLUTION INTRAVENOUS PRN
Status: DISCONTINUED | OUTPATIENT
Start: 2023-07-07 | End: 2023-07-07 | Stop reason: SDUPTHER

## 2023-07-07 RX ORDER — MEPERIDINE HYDROCHLORIDE 50 MG/ML
12.5 INJECTION INTRAMUSCULAR; INTRAVENOUS; SUBCUTANEOUS ONCE
Status: COMPLETED | OUTPATIENT
Start: 2023-07-07 | End: 2023-07-07

## 2023-07-07 RX ORDER — PROPOFOL 10 MG/ML
INJECTION, EMULSION INTRAVENOUS PRN
Status: DISCONTINUED | OUTPATIENT
Start: 2023-07-07 | End: 2023-07-07 | Stop reason: SDUPTHER

## 2023-07-07 RX ORDER — CYCLOBENZAPRINE HCL 5 MG
TABLET ORAL
Status: COMPLETED
Start: 2023-07-07 | End: 2023-07-07

## 2023-07-07 RX ORDER — OXYCODONE HYDROCHLORIDE 5 MG/1
5 TABLET ORAL PRN
Status: DISCONTINUED | OUTPATIENT
Start: 2023-07-07 | End: 2023-07-07 | Stop reason: HOSPADM

## 2023-07-07 RX ORDER — ONDANSETRON 2 MG/ML
INJECTION INTRAMUSCULAR; INTRAVENOUS PRN
Status: DISCONTINUED | OUTPATIENT
Start: 2023-07-07 | End: 2023-07-07 | Stop reason: SDUPTHER

## 2023-07-07 RX ORDER — MIDAZOLAM HYDROCHLORIDE 1 MG/ML
INJECTION INTRAMUSCULAR; INTRAVENOUS PRN
Status: DISCONTINUED | OUTPATIENT
Start: 2023-07-07 | End: 2023-07-07 | Stop reason: SDUPTHER

## 2023-07-07 RX ORDER — LIDOCAINE HYDROCHLORIDE 10 MG/ML
INJECTION, SOLUTION INFILTRATION; PERINEURAL PRN
Status: DISCONTINUED | OUTPATIENT
Start: 2023-07-07 | End: 2023-07-07 | Stop reason: SDUPTHER

## 2023-07-07 RX ORDER — BUPIVACAINE HYDROCHLORIDE 2.5 MG/ML
INJECTION, SOLUTION EPIDURAL; INFILTRATION; INTRACAUDAL
Status: DISCONTINUED
Start: 2023-07-07 | End: 2023-07-07 | Stop reason: HOSPADM

## 2023-07-07 RX ORDER — KETOROLAC TROMETHAMINE 30 MG/ML
INJECTION, SOLUTION INTRAMUSCULAR; INTRAVENOUS PRN
Status: DISCONTINUED | OUTPATIENT
Start: 2023-07-07 | End: 2023-07-07 | Stop reason: SDUPTHER

## 2023-07-07 RX ORDER — FENTANYL CITRATE 50 UG/ML
INJECTION, SOLUTION INTRAMUSCULAR; INTRAVENOUS PRN
Status: DISCONTINUED | OUTPATIENT
Start: 2023-07-07 | End: 2023-07-07 | Stop reason: SDUPTHER

## 2023-07-07 RX ORDER — SODIUM CHLORIDE 9 MG/ML
INJECTION, SOLUTION INTRAVENOUS PRN
Status: DISCONTINUED | OUTPATIENT
Start: 2023-07-07 | End: 2023-07-07 | Stop reason: HOSPADM

## 2023-07-07 RX ORDER — LABETALOL HYDROCHLORIDE 5 MG/ML
10 INJECTION, SOLUTION INTRAVENOUS
Status: DISCONTINUED | OUTPATIENT
Start: 2023-07-07 | End: 2023-07-07 | Stop reason: HOSPADM

## 2023-07-07 RX ORDER — OXYCODONE HYDROCHLORIDE 5 MG/1
10 TABLET ORAL PRN
Status: DISCONTINUED | OUTPATIENT
Start: 2023-07-07 | End: 2023-07-07 | Stop reason: HOSPADM

## 2023-07-07 RX ORDER — DEXAMETHASONE SODIUM PHOSPHATE 10 MG/ML
INJECTION, SOLUTION INTRAMUSCULAR; INTRAVENOUS PRN
Status: DISCONTINUED | OUTPATIENT
Start: 2023-07-07 | End: 2023-07-07 | Stop reason: SDUPTHER

## 2023-07-07 RX ORDER — MIDAZOLAM HYDROCHLORIDE 1 MG/ML
INJECTION INTRAMUSCULAR; INTRAVENOUS
Status: COMPLETED
Start: 2023-07-07 | End: 2023-07-07

## 2023-07-07 RX ORDER — NEOSTIGMINE METHYLSULFATE 5 MG/5 ML
SYRINGE (ML) INTRAVENOUS PRN
Status: DISCONTINUED | OUTPATIENT
Start: 2023-07-07 | End: 2023-07-07 | Stop reason: SDUPTHER

## 2023-07-07 RX ORDER — CYCLOBENZAPRINE HCL 10 MG
10 TABLET ORAL 3 TIMES DAILY PRN
Qty: 15 TABLET | Refills: 0 | Status: SHIPPED | OUTPATIENT
Start: 2023-07-07 | End: 2023-07-12

## 2023-07-07 RX ORDER — SODIUM CHLORIDE 0.9 % (FLUSH) 0.9 %
5-40 SYRINGE (ML) INJECTION EVERY 12 HOURS SCHEDULED
Status: DISCONTINUED | OUTPATIENT
Start: 2023-07-07 | End: 2023-07-07 | Stop reason: HOSPADM

## 2023-07-07 RX ORDER — BUPIVACAINE HYDROCHLORIDE AND EPINEPHRINE 5; 5 MG/ML; UG/ML
INJECTION, SOLUTION PERINEURAL
Status: DISCONTINUED
Start: 2023-07-07 | End: 2023-07-07 | Stop reason: WASHOUT

## 2023-07-07 RX ORDER — MEPERIDINE HYDROCHLORIDE 50 MG/ML
INJECTION INTRAMUSCULAR; INTRAVENOUS; SUBCUTANEOUS
Status: COMPLETED
Start: 2023-07-07 | End: 2023-07-07

## 2023-07-07 RX ORDER — OXYCODONE HYDROCHLORIDE AND ACETAMINOPHEN 5; 325 MG/1; MG/1
1 TABLET ORAL EVERY 6 HOURS PRN
Qty: 20 TABLET | Refills: 0 | Status: SHIPPED | OUTPATIENT
Start: 2023-07-07 | End: 2023-07-12

## 2023-07-07 RX ORDER — CEFAZOLIN SODIUM 2 G/50ML
SOLUTION INTRAVENOUS PRN
Status: DISCONTINUED | OUTPATIENT
Start: 2023-07-07 | End: 2023-07-07 | Stop reason: SDUPTHER

## 2023-07-07 RX ORDER — GLYCOPYRROLATE 0.2 MG/ML
INJECTION INTRAMUSCULAR; INTRAVENOUS PRN
Status: DISCONTINUED | OUTPATIENT
Start: 2023-07-07 | End: 2023-07-07 | Stop reason: SDUPTHER

## 2023-07-07 RX ORDER — METOCLOPRAMIDE HYDROCHLORIDE 5 MG/ML
10 INJECTION INTRAMUSCULAR; INTRAVENOUS
Status: DISCONTINUED | OUTPATIENT
Start: 2023-07-07 | End: 2023-07-07 | Stop reason: HOSPADM

## 2023-07-07 RX ORDER — ONDANSETRON 4 MG/1
4 TABLET, FILM COATED ORAL DAILY PRN
Qty: 5 TABLET | Refills: 0 | Status: SHIPPED | OUTPATIENT
Start: 2023-07-07 | End: 2023-07-12

## 2023-07-07 RX ORDER — DOCUSATE SODIUM 100 MG/1
100 CAPSULE, LIQUID FILLED ORAL 2 TIMES DAILY
Qty: 60 CAPSULE | Refills: 0 | Status: SHIPPED | OUTPATIENT
Start: 2023-07-07 | End: 2023-08-06

## 2023-07-07 RX ORDER — MIDAZOLAM HYDROCHLORIDE 2 MG/2ML
2 INJECTION, SOLUTION INTRAMUSCULAR; INTRAVENOUS
Status: COMPLETED | OUTPATIENT
Start: 2023-07-07 | End: 2023-07-07

## 2023-07-07 RX ORDER — SODIUM CHLORIDE, SODIUM LACTATE, POTASSIUM CHLORIDE, CALCIUM CHLORIDE 600; 310; 30; 20 MG/100ML; MG/100ML; MG/100ML; MG/100ML
INJECTION, SOLUTION INTRAVENOUS CONTINUOUS PRN
Status: DISCONTINUED | OUTPATIENT
Start: 2023-07-07 | End: 2023-07-07 | Stop reason: SDUPTHER

## 2023-07-07 RX ORDER — ONDANSETRON 2 MG/ML
4 INJECTION INTRAMUSCULAR; INTRAVENOUS
Status: DISCONTINUED | OUTPATIENT
Start: 2023-07-07 | End: 2023-07-07 | Stop reason: HOSPADM

## 2023-07-07 RX ORDER — HYDRALAZINE HYDROCHLORIDE 20 MG/ML
10 INJECTION INTRAMUSCULAR; INTRAVENOUS
Status: DISCONTINUED | OUTPATIENT
Start: 2023-07-07 | End: 2023-07-07 | Stop reason: HOSPADM

## 2023-07-07 RX ORDER — MORPHINE SULFATE 2 MG/ML
1 INJECTION, SOLUTION INTRAMUSCULAR; INTRAVENOUS EVERY 5 MIN PRN
Status: DISCONTINUED | OUTPATIENT
Start: 2023-07-07 | End: 2023-07-07 | Stop reason: HOSPADM

## 2023-07-07 RX ORDER — SODIUM CHLORIDE 0.9 % (FLUSH) 0.9 %
5-40 SYRINGE (ML) INJECTION PRN
Status: DISCONTINUED | OUTPATIENT
Start: 2023-07-07 | End: 2023-07-07 | Stop reason: HOSPADM

## 2023-07-07 RX ORDER — CYCLOBENZAPRINE HCL 5 MG
10 TABLET ORAL ONCE
Status: COMPLETED | OUTPATIENT
Start: 2023-07-07 | End: 2023-07-07

## 2023-07-07 RX ORDER — BUPIVACAINE HYDROCHLORIDE 2.5 MG/ML
INJECTION, SOLUTION INFILTRATION; PERINEURAL PRN
Status: DISCONTINUED | OUTPATIENT
Start: 2023-07-07 | End: 2023-07-07 | Stop reason: ALTCHOICE

## 2023-07-07 RX ORDER — DIPHENHYDRAMINE HYDROCHLORIDE 50 MG/ML
12.5 INJECTION INTRAMUSCULAR; INTRAVENOUS
Status: DISCONTINUED | OUTPATIENT
Start: 2023-07-07 | End: 2023-07-07 | Stop reason: HOSPADM

## 2023-07-07 RX ADMIN — HYDROMORPHONE HYDROCHLORIDE 0.5 MG: 1 INJECTION, SOLUTION INTRAMUSCULAR; INTRAVENOUS; SUBCUTANEOUS at 15:55

## 2023-07-07 RX ADMIN — MIDAZOLAM HYDROCHLORIDE 1 MG: 1 INJECTION, SOLUTION INTRAMUSCULAR; INTRAVENOUS at 16:14

## 2023-07-07 RX ADMIN — ONDANSETRON 4 MG: 2 INJECTION INTRAMUSCULAR; INTRAVENOUS at 15:17

## 2023-07-07 RX ADMIN — SODIUM CHLORIDE, POTASSIUM CHLORIDE, SODIUM LACTATE AND CALCIUM CHLORIDE: 600; 310; 30; 20 INJECTION, SOLUTION INTRAVENOUS at 13:27

## 2023-07-07 RX ADMIN — PROPOFOL 200 MG: 10 INJECTION, EMULSION INTRAVENOUS at 13:31

## 2023-07-07 RX ADMIN — GLYCOPYRROLATE 0.4 MG: 0.2 INJECTION INTRAMUSCULAR; INTRAVENOUS at 15:42

## 2023-07-07 RX ADMIN — FENTANYL CITRATE 50 MCG: 50 INJECTION, SOLUTION INTRAMUSCULAR; INTRAVENOUS at 13:31

## 2023-07-07 RX ADMIN — Medication 0.5 MG: at 15:55

## 2023-07-07 RX ADMIN — KETOROLAC TROMETHAMINE 30 MG: 30 INJECTION, SOLUTION INTRAMUSCULAR; INTRAVENOUS at 15:17

## 2023-07-07 RX ADMIN — CYCLOBENZAPRINE HYDROCHLORIDE 10 MG: 5 TABLET, FILM COATED ORAL at 16:29

## 2023-07-07 RX ADMIN — Medication 4 MG: at 15:42

## 2023-07-07 RX ADMIN — HYDROMORPHONE HYDROCHLORIDE 0.5 MG: 1 INJECTION, SOLUTION INTRAMUSCULAR; INTRAVENOUS; SUBCUTANEOUS at 16:33

## 2023-07-07 RX ADMIN — ROCURONIUM BROMIDE 50 MG: 10 INJECTION INTRAVENOUS at 13:31

## 2023-07-07 RX ADMIN — MEPERIDINE HYDROCHLORIDE 12.5 MG: 50 INJECTION INTRAMUSCULAR; INTRAVENOUS; SUBCUTANEOUS at 16:07

## 2023-07-07 RX ADMIN — MIDAZOLAM HYDROCHLORIDE 1 MG: 2 INJECTION, SOLUTION INTRAMUSCULAR; INTRAVENOUS at 16:14

## 2023-07-07 RX ADMIN — MIDAZOLAM 2 MG: 1 INJECTION INTRAMUSCULAR; INTRAVENOUS at 13:27

## 2023-07-07 RX ADMIN — CEFAZOLIN SODIUM 2000 MG: 2 SOLUTION INTRAVENOUS at 13:27

## 2023-07-07 RX ADMIN — Medication 0.5 MG: at 16:33

## 2023-07-07 RX ADMIN — MEPERIDINE HYDROCHLORIDE 12.5 MG: 50 INJECTION, SOLUTION INTRAMUSCULAR; INTRAVENOUS; SUBCUTANEOUS at 16:07

## 2023-07-07 RX ADMIN — FENTANYL CITRATE 50 MCG: 50 INJECTION, SOLUTION INTRAMUSCULAR; INTRAVENOUS at 14:17

## 2023-07-07 RX ADMIN — Medication 10 MG: at 16:29

## 2023-07-07 RX ADMIN — DEXAMETHASONE SODIUM PHOSPHATE 10 MG: 10 INJECTION, SOLUTION INTRAMUSCULAR; INTRAVENOUS at 13:37

## 2023-07-07 RX ADMIN — LIDOCAINE HYDROCHLORIDE 50 MG: 10 INJECTION, SOLUTION INFILTRATION; PERINEURAL at 13:31

## 2023-07-07 ASSESSMENT — PAIN SCALES - GENERAL
PAINLEVEL_OUTOF10: 6
PAINLEVEL_OUTOF10: 6
PAINLEVEL_OUTOF10: 10
PAINLEVEL_OUTOF10: 8

## 2023-07-07 ASSESSMENT — PAIN DESCRIPTION - ORIENTATION
ORIENTATION: RIGHT;LOWER
ORIENTATION: RIGHT
ORIENTATION: RIGHT

## 2023-07-07 ASSESSMENT — PAIN DESCRIPTION - LOCATION
LOCATION: ABDOMEN

## 2023-07-07 ASSESSMENT — PAIN - FUNCTIONAL ASSESSMENT: PAIN_FUNCTIONAL_ASSESSMENT: NONE - DENIES PAIN

## 2023-07-07 ASSESSMENT — PAIN DESCRIPTION - DESCRIPTORS
DESCRIPTORS: SORE
DESCRIPTORS: SHARP
DESCRIPTORS: SHARP;PRESSURE
DESCRIPTORS: SORE

## 2023-07-07 ASSESSMENT — PAIN DESCRIPTION - PAIN TYPE: TYPE: SURGICAL PAIN

## 2023-07-07 NOTE — DISCHARGE INSTRUCTIONS
Patient Discharge Instructions  Discharge Date:  7/7/2023    HYGEINE: Ok to shower, no soaking in a tub/pool until seen at your follow up appointment. Clean incision daily with gentle soap and water, do not use alcohol/peroxide or any harsh cleansers. DRIVING: No driving while taking narcotic medications or while in pain, abstain from critical decision making for 24hrs until anesthesia wears off. ACTIVITY: No lifting greater than 10lbs for 6 weeks or any strenuous activity until you are cleared by Dr. Robi Esqueda. DIET: Resume your normal diet as tolerated. MEDICATIONS: Take all medications as prescribed. SPECIAL INSTRUCTIONS:     Follow-up   Follow up with Dr. Robi Esqueda in 10 days. If you don't already have a scheduled  appointment, please call the office. Call Your Doctor If Any of the Following Occurs   Monitor your recovery once you leave the hospital. If any of the following occur, call your doctor:   Signs of infection, including fever above 100.5F    Redness, swelling, increasing pain, excessive bleeding, or any discharge from the incision site   Persistent nausea and/or vomiting   Pain that you can't control with the medications you've been given   Shortness of breath and/or chest pain   Tachycardia (racing heart sensation) unrelieved by rest  Pain, redness and/or swelling in your feet or legs    Sudden onset of severe left shoulder pain or severe abdominal pain  Any symptoms that are causing you concern   In case of an emergency, call 911 immediately.

## 2023-07-07 NOTE — OP NOTE
Operative Note      Patient: Linda Morales  YOB: 1975  MRN: [id number]    Date of Procedure: 7/7/2023    Pre-Op Diagnosis Codes:     * Right inguinal hernia [K40.90]    Post-Op Diagnosis: Same       Procedure(s):  LAPAROSCOPIC ROBOTIC INGUINAL HERNIA REPAIR, INTRAOP TAP BLOCK    Surgeon(s):  Tan Mcguire IV, DO    Assistant:   Resident: Maryagnes Osgood, DO; Viola Edmond DO    Anesthesia: General    Estimated Blood Loss (mL): 06WY    Complications: None    Specimens:   * No specimens in log *    Implants:  Implant Name Type Inv. Item Serial No.  Lot No. LRB No. Used Action   MESH SURG W3.5XL6IN POLY SELF FIXATING RECT W/ RESRB TRACY - [id number]  MESH SURG W3.5XL6IN POLY SELF FIXATING RECT W/ RESRB TRACY  [employer]  SURGICAL-WD [id number] Right 1 Implanted         Drains:   [REMOVED] Urinary Catheter 07/07/23 Dixon (Removed)       Findings: WC-I, reduction and mesh repair of right direct inguinal hernia    Indication: Patient is a 54y male with complaints of bulging and pain in the right groin, worse on exertion, and is lifestyle limiting. Patient here to undergo right inguinal robotic assisted laparoscopic hernia repair. Description of procedure:    Patient taken to operative suite. Placed in supine position. General anesthesia with endotracheal tube was induced. Preoperative antibiotics were given in accordance with SCIP protocol. SCDs placed for DVT prophylaxis. A Aurea hugger was placed to keep patient euthermic. Abdomen and groin region was shaved with clippers and prepped and draped in usual sterile fashion. An appropriate time out was performed prior to skin incision. Verese needle was placed in the left upper quadrant at Turcios's point. A saline drop test was used to confirm entrance into the abdomen. Pneumoperitoneum was created with insufflation of carbon dioxide to 15 mmHg. An 8 mmHg Airseal port was placed in the left upper quadrant using optiview.

## 2023-07-07 NOTE — ANESTHESIA POSTPROCEDURE EVALUATION
Department of Anesthesiology  Postprocedure Note    Patient: Tonie Overton  MRN: [id number]  [address] 1975  Date of evaluation: 7/7/2023      Procedure Summary     Date: 07/07/23 Room / Location: [address]    Anesthesia Start: 2119 Anesthesia Stop: 0745    Procedure: LAPAROSCOPIC ROBOTIC INGUINAL HERNIA REPAIR, INTRAOP TAP BLOCK (Right) Diagnosis:       Right inguinal hernia      (Right inguinal hernia [K40.90])    Surgeons: Rima Draper IV, DO Responsible Provider: Vicky Lyman MD    Anesthesia Type: general ASA Status: 1          Anesthesia Type: No value filed.     Tony Phase I: Tony Score: 7    Tony Phase II:        Anesthesia Post Evaluation    Patient location during evaluation: PACU  Patient participation: complete - patient participated  Level of consciousness: awake and alert  Airway patency: patent  Nausea & Vomiting: no nausea and no vomiting  Complications: no  Cardiovascular status: hemodynamically stable  Respiratory status: room air and spontaneous ventilation  Hydration status: euvolemic  Multimodal analgesia pain management approach

## 2023-07-07 NOTE — INTERVAL H&P NOTE
Update History & Physical    The patient's History and Physical of June 14, 2023 was reviewed with the patient and I examined the patient. There was no change. The surgical site was confirmed by the patient and me. Plan: The risks, benefits, expected outcome, and alternative to the recommended procedure have been discussed with the patient. Patient understands and wants to proceed with the procedure.      Electronically signed by Teresa Gonzalez DO on 7/7/2023 at 1:07 PM

## 2023-07-11 ENCOUNTER — TELEPHONE (OUTPATIENT)
Dept: SURGERY | Age: 48
End: 2023-07-11

## 2023-07-11 NOTE — TELEPHONE ENCOUNTER
Patient called and stated that he would need a letter emailed to his employer that pt had hernia surgery and would return to work after seeing pt in office for post op visit on 07/26 if everything looks good. Email adddress : [email or web address] due to their fax is down at this time. If you would need to call pt you may do so at [phone]- pt out of town due to his mom passed away and will be back in time for July 26 appt.   Thank you

## 2023-07-26 ENCOUNTER — OFFICE VISIT (OUTPATIENT)
Dept: SURGERY | Age: 48
End: 2023-07-26

## 2023-07-26 VITALS
WEIGHT: 149 LBS | HEART RATE: 65 BPM | BODY MASS INDEX: 24.05 KG/M2 | SYSTOLIC BLOOD PRESSURE: 108 MMHG | DIASTOLIC BLOOD PRESSURE: 71 MMHG

## 2023-07-26 DIAGNOSIS — Z48.89 POSTOPERATIVE VISIT: Primary | ICD-10-CM

## 2023-07-26 RX ORDER — GABAPENTIN 100 MG/1
100 CAPSULE ORAL 3 TIMES DAILY
Qty: 42 CAPSULE | Refills: 0 | Status: SHIPPED | OUTPATIENT
Start: 2023-07-26 | End: 2023-08-09

## 2023-07-26 RX ORDER — GABAPENTIN 100 MG/1
100 CAPSULE ORAL 3 TIMES DAILY
Qty: 42 CAPSULE | Refills: 0 | Status: SHIPPED | OUTPATIENT
Start: 2023-08-09 | End: 2023-08-23

## 2023-07-26 RX ORDER — CYCLOBENZAPRINE HCL 10 MG
10 TABLET ORAL 3 TIMES DAILY PRN
Qty: 42 TABLET | Refills: 0 | Status: SHIPPED | OUTPATIENT
Start: 2023-07-26 | End: 2023-08-09

## 2023-07-26 RX ORDER — CYCLOBENZAPRINE HCL 10 MG
10 TABLET ORAL 3 TIMES DAILY PRN
Qty: 21 TABLET | Refills: 0 | Status: SHIPPED | OUTPATIENT
Start: 2023-08-09 | End: 2023-08-19

## 2023-07-26 NOTE — PROGRESS NOTES
Visit Information    Have you changed or started any medications since your last visit including any over-the-counter medicines, vitamins, or herbal medicines? no   Have you stopped taking any of your medications? Is so, why? -  no  Are you having any side effects from any of your medications? - no    Have you seen any other physician or provider since your last visit?  no   Have you had any other diagnostic tests since your last visit? yes    Have you been seen in the emergency room and/or had an admission in a hospital since we last saw you?  no   Have you had your routine dental cleaning in the past 6 months?  no     Do you have an active MyChart account? If no, what is the barrier?   No    Patient Care Team:  Tere Monet MD as PCP - Empaneled Provider    Medical History Review  Past Medical, Family, and Social History reviewed and does not contribute to the patient presenting condition    Health Maintenance   Topic Date Due    COVID-19 Vaccine (1) Never done    DTaP/Tdap/Td vaccine (1 - Tdap) Never done    Depression Screen  07/29/2023    Flu vaccine (1) 08/01/2023    Colorectal Cancer Screen  08/07/2025    Lipids  07/29/2027    Hepatitis C screen  Completed    HIV screen  Completed    Hepatitis A vaccine  Aged Out    Hib vaccine  Aged Out    Meningococcal (ACWY) vaccine  Aged Out    Pneumococcal 0-64 years Vaccine  Aged Out
8/9/2023] gabapentin (NEURONTIN) 100 MG capsule Take 1 capsule by mouth 3 times daily for 14 days. 42 capsule 0    docusate sodium (COLACE) 100 MG capsule Take 1 capsule by mouth 2 times daily 60 capsule 0     No current facility-administered medications for this visit. No Known Allergies    No family history on file. Social History     Socioeconomic History    Marital status: Single     Spouse name: Not on file    Number of children: Not on file    Years of education: Not on file    Highest education level: Not on file   Occupational History    Not on file   Tobacco Use    Smoking status: Unknown    Smokeless tobacco: Not on file   Vaping Use    Vaping Use: Never used   Substance and Sexual Activity    Alcohol use: Never    Drug use: Never    Sexual activity: Not on file   Other Topics Concern    Not on file   Social History Narrative    Not on file     Social Determinants of Health     Financial Resource Strain: Not on file   Food Insecurity: Not on file   Transportation Needs: Not on file   Physical Activity: Not on file   Stress: Not on file   Social Connections: Not on file   Intimate Partner Violence: Not on file   Housing Stability: Not on file       ROS:   11 systems reviewed. Negative other than those noted above. Physical Exam:  Vitals:    07/26/23 1031   BP: 108/71   Pulse: 65     General:A & O x3  HEENT:  NCAT, PERRL, EMOI, oral mucus membrane pink and moist, no mass palpated on neck exam  Heart: S1S2, no mumurs, RRR  Lungs: clear to auscultation without wheezes or rales  Abdomen: Laparoscopic incisional site are dry and intact, no signs of erythematous change or signs of infection. Abdomen soft, nondistended, non tender to palpate no rebound or guarding. Extremity: negative  SKIN: Skin color, texture, turgor normal. No rashes or lesions. Neuro: CN II-XII grossly intact. No motor or sensory deficits appreciated.   MK:normal throughout upper and lower extremities    Assessment     48 years

## 2023-08-15 ENCOUNTER — TELEPHONE (OUTPATIENT)
Dept: OTHER | Age: 48
End: 2023-08-15

## 2023-08-15 NOTE — TELEPHONE ENCOUNTER
Patient states that he has a work conflict and will not be at his appointment tomorrow 8/16/23 at 9:45 AM.  Patient will call to reschedule.

## 2024-01-16 ENCOUNTER — HOSPITAL ENCOUNTER (EMERGENCY)
Age: 49
Discharge: HOME | End: 2024-01-16
Attending: EMERGENCY MEDICINE
Payer: COMMERCIAL

## 2024-01-16 VITALS
BODY MASS INDEX: 23.81 KG/M2 | OXYGEN SATURATION: 97 % | WEIGHT: 147.49 LBS | DIASTOLIC BLOOD PRESSURE: 74 MMHG | SYSTOLIC BLOOD PRESSURE: 113 MMHG | HEART RATE: 70 BPM | RESPIRATION RATE: 19 BRPM | TEMPERATURE: 98.7 F

## 2024-01-16 DIAGNOSIS — R10.31 RIGHT INGUINAL PAIN: Primary | ICD-10-CM

## 2024-01-16 PROCEDURE — 6370000000 HC RX 637 (ALT 250 FOR IP): Performed by: HEALTH CARE PROVIDER

## 2024-01-16 PROCEDURE — 99283 EMERGENCY DEPT VISIT LOW MDM: CPT

## 2024-01-16 RX ORDER — ACETAMINOPHEN 500 MG
1000 TABLET ORAL 3 TIMES DAILY
Qty: 60 TABLET | Refills: 0 | Status: SHIPPED | OUTPATIENT
Start: 2024-01-16

## 2024-01-16 RX ORDER — ACETAMINOPHEN 500 MG
1000 TABLET ORAL ONCE
Status: COMPLETED | OUTPATIENT
Start: 2024-01-16 | End: 2024-01-16

## 2024-01-16 RX ORDER — IBUPROFEN 800 MG/1
800 TABLET ORAL EVERY 8 HOURS PRN
Qty: 60 TABLET | Refills: 1 | Status: SHIPPED | OUTPATIENT
Start: 2024-01-16

## 2024-01-16 RX ORDER — IBUPROFEN 800 MG/1
800 TABLET ORAL ONCE
Status: COMPLETED | OUTPATIENT
Start: 2024-01-16 | End: 2024-01-16

## 2024-01-16 RX ORDER — CYCLOBENZAPRINE HCL 10 MG
10 TABLET ORAL 3 TIMES DAILY PRN
Qty: 15 TABLET | Refills: 0 | Status: SHIPPED | OUTPATIENT
Start: 2024-01-16 | End: 2024-01-21

## 2024-01-16 RX ADMIN — IBUPROFEN 800 MG: 800 TABLET, FILM COATED ORAL at 15:01

## 2024-01-16 RX ADMIN — ACETAMINOPHEN 1000 MG: 500 TABLET ORAL at 15:02

## 2024-01-16 ASSESSMENT — ENCOUNTER SYMPTOMS
DIARRHEA: 0
ABDOMINAL PAIN: 0
VOMITING: 0
NAUSEA: 0
CONSTIPATION: 0
SHORTNESS OF BREATH: 0

## 2024-01-16 ASSESSMENT — PAIN SCALES - GENERAL: PAINLEVEL_OUTOF10: 9

## 2024-01-16 ASSESSMENT — PAIN - FUNCTIONAL ASSESSMENT: PAIN_FUNCTIONAL_ASSESSMENT: 0-10

## 2024-01-16 NOTE — ED PROVIDER NOTES
[facility]     Emergency Department     Faculty Attestation    I performed a history and physical examination of the patient and discussed management with the resident. I have reviewed and agree with the resident’s findings including all diagnostic interpretations, and treatment plans as written at the time of my review. Any areas of disagreement are noted on the chart. I was personally present for the key portions of any procedures. I have documented in the chart those procedures where I was not present during the key portions. For Physician Assistant/ Nurse Practitioner cases/documentation I have personally evaluated this patient and have completed at least one if not all key elements of the E/M (history, physical exam, and MDM). Additional findings are as noted.    PtName: Luis M Key  MRN: [id number]  Birthdate 1975  Date of evaluation: 1/16/24  Note Started: 3:24 PM EST    Primary Care Physician: No primary care provider on file.        History: This is a 48 y.o. male who presents to the Emergency Department with complaint of groin pain is ongoing for the last week.  Patient states the pain comes and goes.  Patient had a previous hernia repair.    Physical:   weight is 66.9 kg (147 lb 7.8 oz). His oral temperature is 98.7 °F (37.1 °C). His blood pressure is 113/74 and his pulse is 70. His respiration is 19 and oxygen saturation is 97%.  There is some mild tenderness to palpation along the right side of the groin.  No hernias palpated.    Impression: Right groin pain    Plan: Analgesia, refer to surgeon who performed his hernia repair surgery.      Medical Decision Making  Problems Addressed:  Right inguinal pain: self-limited or minor problem    Risk  OTC drugs.  Prescription drug management.            (Please note that portions of this note were completed with a voice recognition program.  Efforts were made to edit the dictations but occasionally 
male presents this time with concerns for pain in the right inguinal region.  Examination valuation patient was in no acute distress and vital signs are stable at this time.  Physical exam is overall unremarkable.  Patient without any significant abdominal tenderness, no hernias appreciated, no overlying erythema in the region.  Has had prior appendectomy no concern for appendicitis at this time.  Having normal bowel movements, no abdominal distention and no nausea or vomiting, no concern for obstruction at this time.  Genitourinary exam is unremarkable, no concern for any testicular pathology. Overall impression is likely musculoskeletal strain.  Patient has not been taking any medications as of yet, will trial some Tylenol Motrin and muscle relaxants.  Discussed the patient follow-up with his primary care provider as well as the surgery clinic.  Information provided for follow-up.    Risk  OTC drugs.  Prescription drug management.        EKG  All EKG's are interpreted by the Emergency Department Physician who either signs or Co-signs this chart in the absence of a cardiologist.    EMERGENCY DEPARTMENT COURSE:    ED Course as of 01/16/24 1640   Tue Jan 16, 2024   1520 Follow up plans and ER return precautions discussed. Patient verbalized understanding and had no further questions at time of discharge. [JS]      ED Course User Index  [JS] Tom Vargas DO       PROCEDURES:    CONSULTS:  None    CRITICAL CARE:    FINAL IMPRESSION      1. Right inguinal pain          DISPOSITION / PLAN     DISPOSITION Decision To Discharge 01/16/2024 03:20:20 PM      PATIENT REFERRED TO:  [facility] ED  [address] [facility] [id number] [phone]  Go to   As needed, If symptoms worsen    [facility]  [address] [place] [address] [id number] [phone]  Schedule an appointment as soon as possible for a visit   to establish care if in need of a primary care provider      DISCHARGE

## 2024-01-16 NOTE — ED TRIAGE NOTES
49 yo male arrived to ED with c/o right groin pain that radiates down right leg. Patient sates had inguinal hernia repair in July 2023. Patient states he lifts object at work over 100lbs. Patient is alert and oriented times 4, speaking full sentences, and answering questions appropriately.

## 2024-01-16 NOTE — DISCHARGE INSTRUCTIONS
Call today or tomorrow to follow up with your primary care provider in 3-5 days.      Call your surgeon to discuss follow up to for reevaluation of your groin pain.    You can take the tylenol, motrin, and flexeril as prescribed.    Return to the Emergency Department if you have any of the following: worsening of pain in your abdomen, no food sounds good to you, you continue to vomit, you develop a fever, pain goes to your back or testicles, or you have pain in the abdomen when going over a bump in the car or when you jump up and down, or for any other concerns you may have.

## 2024-02-09 ENCOUNTER — OFFICE VISIT (OUTPATIENT)
Dept: FAMILY MEDICINE CLINIC | Age: 49
End: 2024-02-09
Payer: COMMERCIAL

## 2024-02-09 VITALS
DIASTOLIC BLOOD PRESSURE: 73 MMHG | TEMPERATURE: 98.3 F | WEIGHT: 141 LBS | HEART RATE: 67 BPM | SYSTOLIC BLOOD PRESSURE: 115 MMHG | BODY MASS INDEX: 22.76 KG/M2

## 2024-02-09 DIAGNOSIS — G47.00 INSOMNIA, UNSPECIFIED TYPE: ICD-10-CM

## 2024-02-09 DIAGNOSIS — R10.31 RIGHT LOWER QUADRANT ABDOMINAL PAIN: Primary | ICD-10-CM

## 2024-02-09 DIAGNOSIS — F41.9 ANXIETY: ICD-10-CM

## 2024-02-09 PROCEDURE — 99213 OFFICE O/P EST LOW 20 MIN: CPT

## 2024-02-09 PROCEDURE — 4004F PT TOBACCO SCREEN RCVD TLK: CPT

## 2024-02-09 PROCEDURE — G8420 CALC BMI NORM PARAMETERS: HCPCS

## 2024-02-09 PROCEDURE — G8427 DOCREV CUR MEDS BY ELIG CLIN: HCPCS

## 2024-02-09 PROCEDURE — G8484 FLU IMMUNIZE NO ADMIN: HCPCS

## 2024-02-09 RX ORDER — LANOLIN ALCOHOL/MO/W.PET/CERES
3 CREAM (GRAM) TOPICAL DAILY
Qty: 60 TABLET | Refills: 3 | Status: SHIPPED | OUTPATIENT
Start: 2024-02-09 | End: 2024-10-06

## 2024-02-09 RX ORDER — BUSPIRONE HYDROCHLORIDE 7.5 MG/1
7.5 TABLET ORAL 2 TIMES DAILY
Qty: 60 TABLET | Refills: 0 | Status: SHIPPED | OUTPATIENT
Start: 2024-02-09 | End: 2024-03-10

## 2024-02-09 ASSESSMENT — PATIENT HEALTH QUESTIONNAIRE - PHQ9
2. FEELING DOWN, DEPRESSED OR HOPELESS: 0
1. LITTLE INTEREST OR PLEASURE IN DOING THINGS: 0
SUM OF ALL RESPONSES TO PHQ QUESTIONS 1-9: 0
SUM OF ALL RESPONSES TO PHQ9 QUESTIONS 1 & 2: 0
SUM OF ALL RESPONSES TO PHQ QUESTIONS 1-9: 0

## 2024-02-09 ASSESSMENT — ENCOUNTER SYMPTOMS
CONSTIPATION: 0
DIARRHEA: 0
COUGH: 0
NAUSEA: 0
VOMITING: 0
STRIDOR: 0
SHORTNESS OF BREATH: 0
WHEEZING: 0

## 2024-02-09 NOTE — PROGRESS NOTES
Attending Physician Statement  I  have discussed the care of Luis M Key including pertinent history and exam findings with the resident. I agree with the assessment, plan and orders as documented by the resident.      /73 (Site: Right Upper Arm, Position: Sitting, Cuff Size: Medium Adult)   Pulse 67   Temp 98.3 °F (36.8 °C) (Oral)   Wt 64 kg (141 lb)   BMI 22.76 kg/m²    BP Readings from Last 3 Encounters:   02/09/24 115/73   01/16/24 113/74   07/26/23 108/71     Wt Readings from Last 3 Encounters:   02/09/24 64 kg (141 lb)   01/16/24 66.9 kg (147 lb 7.8 oz)   07/26/23 67.6 kg (149 lb)          Diagnosis Orders   1. Right lower quadrant abdominal pain  CT ABDOMEN PELVIS W WO CONTRAST Additional Contrast? None      2. Anxiety  busPIRone (BUSPAR) 7.5 MG tablet      3. Insomnia, unspecified type  melatonin (RA MELATONIN) 3 MG TABS tablet          Anxiety and nightmares   Will start buspar and melatonin     Right sided hernia was repair  Now has tenderness and discomfort for last 2 months      Damaso Anderson MD 2/9/2024 5:12 PM

## 2024-02-09 NOTE — PROGRESS NOTES
[facility] - Outpatient Note      Subjective:    Luis M Key is a 48 y.o. male with  has no past medical history on file.    Presented to the office today for:  Chief Complaint   Patient presents with    Flank Pain     Patient is having right side pain 3/10 fro 2  to 3 weeks       HPI    48-year-old male presented to office today for abdominal pain, pain is 10 right lower abdominal quadrant, started 2 months ago, it is there all the time, rates it 6/10, increased with weight lifting, and body movement, no relieving factors patient has a history of hernia raphe in June/23, but patient remained asymptomatic until he developed this pain 2 months ago.  Though she denies any change in bowel movements, denied constipation denies nausea vomiting and diarrhea, no bulge on weight lifting, coughing or straining    His mother also passed away in December/23, and he has been having nightmares something is wrong with his body, he was  overwhelmingly concerned that something is wrong with his body, also was requesting extensive blood work, patient also mentioned that he is still having troubles falling asleep.    Discussed with patient that there might be some complication of surgery, it can also be post herniorrhaphy neuralgia, will follow results of CT scan.    Care gaps discussed with the patient, patient had colonoscopy done in [place], records unavailable, results not available, but he remembers that it was unremarkable    Will see patient again in 4 weeks with results of CT scan, will discuss vaccination with him at that point.  Review of Systems   Constitutional:  Negative for fatigue, fever and unexpected weight change.   Respiratory:  Negative for cough, shortness of breath, wheezing and stridor.    Gastrointestinal:  Negative for constipation, diarrhea, nausea and vomiting.   Skin:  Negative for rash.   Hematological:  Negative for adenopathy.

## 2024-02-09 NOTE — PATIENT INSTRUCTIONS
Thank you for letting us take care of you today. We hope all your questions were addressed. If a question was overlooked or something else comes to mind after you return home, please contact a member of your Care Team listed below.      Your Care Team at [facility] is Team #  Robert Lyle, (Faculty)  Hernán Donlad (Resident)  Quentin Edwards (Resident)  Hernán Martin (Resident)   Breann Lazo (Resident)  Marisela Lara (Resident)  Che Foley., [facility]  rBina Mirnada., [facility]  Janna Schmidt, [facility]  Erika Kraus, [facility]  Tom Johnson, [facility]  Fina Hobbs, [facility]  Kelsey Kinsey, [facility]  Rosalinda Humphries, [facility]  Rosalinda (LJ) DANICA Goodman (Clinical Practice Manager)  Vilma Callejas [facility] (Clinical Pharmacist)     Office phone number: [phone]    If you need to get in right away due to illness, please be advised we have \"Same Day\" appointments available Monday-Friday. Please call us at [phone] option #3 to schedule your \"Same Day\" appointment.

## 2024-02-09 NOTE — PROGRESS NOTES
Visit Information    Have you changed or started any medications since your last visit including any over-the-counter medicines, vitamins, or herbal medicines? no   Have you stopped taking any of your medications? Is so, why? -  no  Are you having any side effects from any of your medications? - no    Have you seen any other physician or provider since your last visit?  no   Have you had any other diagnostic tests since your last visit?  no   Have you been seen in the emergency room and/or had an admission in a hospital since we last saw you?  no   Have you had your routine dental cleaning in the past 6 months?  no     Do you have an active MyChart account? If no, what is the barrier?  No:     No care team member to display    Medical History Review  Past Medical, Family, and Social History reviewed and does not contribute to the patient presenting condition    Health Maintenance   Topic Date Due    Hepatitis B vaccine (1 of 3 - 3-dose series) Never done    COVID-19 Vaccine (1) Never done    DTaP/Tdap/Td vaccine (1 - Tdap) Never done    Depression Screen  07/29/2023    Flu vaccine (1) Never done    Colorectal Cancer Screen  08/07/2025    Lipids  07/29/2027    Hepatitis C screen  Completed    HIV screen  Completed    Hepatitis A vaccine  Aged Out    Hib vaccine  Aged Out    Polio vaccine  Aged Out    Meningococcal (ACWY) vaccine  Aged Out    Pneumococcal 0-64 years Vaccine  Aged Out

## 2024-02-12 ENCOUNTER — TELEPHONE (OUTPATIENT)
Dept: FAMILY MEDICINE CLINIC | Age: 49
End: 2024-02-12

## 2024-02-12 DIAGNOSIS — R10.31 RIGHT LOWER QUADRANT ABDOMINAL PAIN: Primary | ICD-10-CM

## 2024-02-12 NOTE — TELEPHONE ENCOUNTER
Patient called stating that his order for CT ABD was ordered wrong, scheduling stated it needed to be corrected.   Patient states he is off of work today and if it could be corrected and sent over as stat he can still have it done today .  Please advise and thank you.

## 2024-02-12 NOTE — TELEPHONE ENCOUNTER
[facility] Scheduling calling again for CT Abdomen order to be changed to CT Abdomen with IV Contrast. Please advise.

## 2024-02-13 ENCOUNTER — HOSPITAL ENCOUNTER (OUTPATIENT)
Dept: CT IMAGING | Age: 49
Discharge: HOME | End: 2024-02-15
Payer: COMMERCIAL

## 2024-02-13 DIAGNOSIS — R10.31 RIGHT LOWER QUADRANT ABDOMINAL PAIN: ICD-10-CM

## 2024-02-13 PROCEDURE — 74177 CT ABD & PELVIS W/CONTRAST: CPT

## 2024-02-13 PROCEDURE — 6360000004 HC RX CONTRAST MEDICATION

## 2024-02-13 PROCEDURE — 2580000003 HC RX 258

## 2024-02-13 PROCEDURE — 2500000003 HC RX 250 WO HCPCS

## 2024-02-13 RX ORDER — 0.9 % SODIUM CHLORIDE 0.9 %
80 INTRAVENOUS SOLUTION INTRAVENOUS ONCE
Status: COMPLETED | OUTPATIENT
Start: 2024-02-13 | End: 2024-02-13

## 2024-02-13 RX ORDER — SODIUM CHLORIDE 0.9 % (FLUSH) 0.9 %
10 SYRINGE (ML) INJECTION PRN
Status: DISCONTINUED | OUTPATIENT
Start: 2024-02-13 | End: 2024-02-16 | Stop reason: HOSPADM

## 2024-02-13 RX ADMIN — SODIUM CHLORIDE, PRESERVATIVE FREE 10 ML: 5 INJECTION INTRAVENOUS at 18:09

## 2024-02-13 RX ADMIN — BARIUM SULFATE 450 ML: 20 SUSPENSION ORAL at 18:09

## 2024-02-13 RX ADMIN — SODIUM CHLORIDE 80 ML: 9 INJECTION, SOLUTION INTRAVENOUS at 18:09

## 2024-02-13 RX ADMIN — IOPAMIDOL 75 ML: 755 INJECTION, SOLUTION INTRAVENOUS at 18:09

## 2024-02-13 NOTE — TELEPHONE ENCOUNTER
Patient calling in to [facility] the CT, he was transferred to [facility]         Patient calling back stating he is not getting any where with scheduling, no one is answering or giving a call back. Phone number to scheduling [phone] was given to patient and he states he will try again. MA apologized and made him aware we are unable to schedule for that department, he voiced understanding and hung up.

## 2024-02-19 DIAGNOSIS — K40.21 BILATERAL RECURRENT INGUINAL HERNIA WITHOUT OBSTRUCTION OR GANGRENE: Primary | ICD-10-CM

## 2024-02-19 NOTE — PROGRESS NOTES
Received a call from patient, patient wanted to discuss his results, patient know that he has bilateral small hernia without incarceration or obstruction.  Discussed with patient about treatment plan and that he would benefit from general surgery consultation, patient agrees with the plan, will schedule an appointment for him.    Hernán Donald,   Resident.  PGY1.

## 2024-02-26 NOTE — OP
DATE OF OPERATION:  10/21/19 - SDS

 

DATE OF BIRTH:  [birth year]

 

SURGEON:  Marciano Castle MD

 

FIRST ASSISTANT:  Lakeisha Bond NP.

 

PRE-OP DIAGNOSIS:  Left inguinal hernia.

 

POST-OP DIAGNOSIS:  Left inguinal hernia.

 

OPERATIVE PROCEDURE:  Robotic repair of left inguinal hernia with mesh.

 

INDICATIONS FOR PROCEDURE:  Left inguinal hernia.

 

Risks included but not limited to bleeding, infection, injury to intraabdominal 
contents including the bowel, pelvic nerves and vessels, recurrence of hernia, 
chronic pain.  All explained to the patient who seemed to understand and agreed 
to the procedure and all questions were answered.

 

DESCRIPTION OF PROCEDURE:  The patient was taken to the operating room and 
placed supine.  Preoperative antibiotics were given.  After successful 
induction of general endotracheal anesthesia, the abdomen was prepped and 
draped in the sterile fashion.  Time-out was performed indicating correct 
patient, correct procedure.  Trocar was placed in the left upper quadrant under 
direct visualization of a camera.  Using a bladeless Optiview trocar, 
pneumoperitoneum was achieved with 15 mmHg.  A camera was placed in the abdomen 
and the abdomen was scanned.  There was no obvious injury from trocar 
placement.  8 mm trocars were placed in the midline and right upper quadrant 
and the 5 mm trocar in the left upper quadrant was replaced with an 8 mm 
trocar.  He was placed in a slight Trendelenburg position. The peritoneum was 
taken down and the hernia sac was gently dissected free from the cord.  Cord 
structures were identified and avoided including the vas deferens.  The long 
sac was completely reduced from the cord.  A mesh was placed over the left 
hemipelvis, ProGrip left-sided anatomic mesh, and the peritoneum was then 
closed keeping the sac outside of the mesh and sutured extraperitoneally using 
a V-Loc suture.  The abdomen was scanned.  There was no obvious injury.  
Pneumoperitoneum was released and the trocars were removed.  The skin was 
closed with 3-0 Monocryl and glue.  He tolerated the procedure well.  He was 
extubated and taken to the recovery room in stable condition.

 

 [id number]/CPS #: [id number]

MTDD 5

## 2024-03-13 ENCOUNTER — OFFICE VISIT (OUTPATIENT)
Dept: SURGERY | Age: 49
End: 2024-03-13

## 2024-03-13 VITALS
BODY MASS INDEX: 23.01 KG/M2 | DIASTOLIC BLOOD PRESSURE: 68 MMHG | HEART RATE: 71 BPM | HEIGHT: 66 IN | WEIGHT: 143.2 LBS | SYSTOLIC BLOOD PRESSURE: 109 MMHG

## 2024-03-13 DIAGNOSIS — K59.00 CONSTIPATION, UNSPECIFIED CONSTIPATION TYPE: ICD-10-CM

## 2024-03-13 DIAGNOSIS — Z12.11 ENCOUNTER FOR SCREENING COLONOSCOPY: Primary | ICD-10-CM

## 2024-03-13 PROCEDURE — 99213 OFFICE O/P EST LOW 20 MIN: CPT | Performed by: SURGERY

## 2024-03-13 RX ORDER — POLYETHYLENE GLYCOL 3350 17 G/17G
17 POWDER, FOR SOLUTION ORAL ONCE
Qty: 238 G | Refills: 0 | Status: SHIPPED | OUTPATIENT
Start: 2024-03-13 | End: 2024-03-13

## 2024-03-13 RX ORDER — POLYETHYLENE GLYCOL 3350 17 G/17G
17 POWDER, FOR SOLUTION ORAL DAILY
Qty: 510 G | Refills: 0 | Status: SHIPPED | OUTPATIENT
Start: 2024-03-13 | End: 2024-04-12

## 2024-03-13 RX ORDER — SENNOSIDES 8.6 MG
1 TABLET ORAL DAILY
Qty: 14 TABLET | Refills: 0 | Status: SHIPPED | OUTPATIENT
Start: 2024-03-13 | End: 2024-03-13

## 2024-03-13 RX ORDER — POLYETHYLENE GLYCOL 3350 17 G/17G
17 POWDER, FOR SOLUTION ORAL DAILY
Qty: 289 G | Refills: 0 | Status: SHIPPED | OUTPATIENT
Start: 2024-03-13 | End: 2024-03-13

## 2024-03-13 RX ORDER — SENNOSIDES 8.6 MG
1 TABLET ORAL DAILY
Qty: 14 TABLET | Refills: 0 | Status: SHIPPED | OUTPATIENT
Start: 2024-03-13 | End: 2024-03-27

## 2024-03-13 RX ORDER — POLYETHYLENE GLYCOL 3350 17 G/17G
17 POWDER, FOR SOLUTION ORAL DAILY
Qty: 510 G | Refills: 0 | Status: SHIPPED | OUTPATIENT
Start: 2024-03-13 | End: 2024-03-13

## 2024-03-13 NOTE — PROGRESS NOTES
History and Physical  [facility]    Patient's Name/Date of Birth: Luis M Key / 1975 (48 y.o.)    Date: March 13, 2024     HPI: Pt is a 48 y.o. male, sp right sided robotic laparoscopic hernia repair on 7/7/23, who presents to [facility] with complaints of right abdominal pain. When asked to point to the pain he pointed to a previous appendectomy scar. The pain has been present for the past several months and is exacerbated by heavy lifting. If he is not exerting himself, he is generally not in pain. 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  However, when further asked his pain, he said that bowel movement will alleviate his pain, and he has recent CT of abdomen pelvis showed moderate amount of stool in his colon which is consistent with constipation.  He was concerned about recurrence of hernia, the CT scan did not show recurrence of the hernia either. The patient has 1 bowel movement per day and endorses that the pain frequently subsides after a bowel movement. Denies any unintentional weight loss, change in stool caliber, loss of appetite, hematochezia, melana, or other bowel abnormalities. Has had previous colonoscopy but unsure of indication or when it was performed. Says that \"small polyps\" were seen.    Denies of previous family history of colon cancer.      No past medical history on file.  No significant PMHx    Past Surgical History:   Procedure Laterality Date    APPENDECTOMY      HERNIA REPAIR Right 7/7/2023    LAPAROSCOPIC ROBOTIC INGUINAL HERNIA REPAIR, INTRAOP TAP BLOCK performed by Iron Draper IV, DO at [facility] OR    INGUINAL HERNIA REPAIR Right 07/07/2023       Current Outpatient Medications   Medication Sig Dispense Refill    melatonin (RA MELATONIN) 3 MG TABS tablet Take 1 tablet by mouth daily 30 mins before going to sleep 60 tablet 3    acetaminophen (TYLENOL) 500 MG tablet Take 2 tablets by

## 2024-03-13 NOTE — PROGRESS NOTES
Visit Information    Have you changed or started any medications since your last visit including any over-the-counter medicines, vitamins, or herbal medicines? no   Are you having any side effects from any of your medications? -  no  Have you stopped taking any of your medications? Is so, why? -  no    Have you seen any other physician or provider since your last visit? Yes - Records Obtained  Have you had any other diagnostic tests since your last visit? Yes - Records Requested  Have you been seen in the emergency room and/or had an admission to a hospital since we last saw you? Yes - Records Requested  Have you had your routine dental cleaning in the past 6 months? no    Have you activated your [employer] account? If not, what are your barriers? No:      Patient Care Team:  Hernán Donald MD as PCP - General (Family Medicine)    Medical History Review  Past Medical, Family, and Social History reviewed and does not contribute to the patient presenting condition    Health Maintenance   Topic Date Due    Hepatitis B vaccine (1 of 3 - 3-dose series) Never done    COVID-19 Vaccine (1) Never done    DTaP/Tdap/Td vaccine (1 - Tdap) Never done    Flu vaccine (1) Never done    Depression Screen  02/09/2025    Colorectal Cancer Screen  08/07/2025    Lipids  07/29/2027    Hepatitis C screen  Completed    HIV screen  Completed    Hepatitis A vaccine  Aged Out    Hib vaccine  Aged Out    Polio vaccine  Aged Out    Meningococcal (ACWY) vaccine  Aged Out    Pneumococcal 0-64 years Vaccine  Aged Out

## 2024-07-03 ENCOUNTER — HOSPITAL ENCOUNTER (OUTPATIENT)
Dept: ULTRASOUND IMAGING | Age: 49
Discharge: HOME | End: 2024-07-05
Payer: COMMERCIAL

## 2024-07-03 DIAGNOSIS — S39.011A: ICD-10-CM

## 2024-07-03 PROCEDURE — 76705 ECHO EXAM OF ABDOMEN: CPT

## 2024-07-11 ENCOUNTER — HOSPITAL ENCOUNTER (EMERGENCY)
Age: 49
Discharge: HOME | End: 2024-07-11
Attending: EMERGENCY MEDICINE
Payer: COMMERCIAL

## 2024-07-11 VITALS
HEART RATE: 86 BPM | DIASTOLIC BLOOD PRESSURE: 80 MMHG | SYSTOLIC BLOOD PRESSURE: 114 MMHG | OXYGEN SATURATION: 97 % | HEIGHT: 68 IN | RESPIRATION RATE: 22 BRPM | WEIGHT: 140 LBS | TEMPERATURE: 96.6 F | BODY MASS INDEX: 21.22 KG/M2

## 2024-07-11 DIAGNOSIS — J02.9 SORE THROAT: ICD-10-CM

## 2024-07-11 DIAGNOSIS — J02.9 VIRAL PHARYNGITIS: Primary | ICD-10-CM

## 2024-07-11 DIAGNOSIS — R05.1 ACUTE COUGH: ICD-10-CM

## 2024-07-11 LAB
FLUAV AG SPEC QL: NEGATIVE
FLUBV AG SPEC QL: NEGATIVE
SARS-COV-2 RDRP RESP QL NAA+PROBE: NOT DETECTED
SPECIMEN DESCRIPTION: NORMAL
SPECIMEN SOURCE: NORMAL
STREP A, MOLECULAR: NEGATIVE

## 2024-07-11 PROCEDURE — 87651 STREP A DNA AMP PROBE: CPT

## 2024-07-11 PROCEDURE — 99283 EMERGENCY DEPT VISIT LOW MDM: CPT

## 2024-07-11 PROCEDURE — 87635 SARS-COV-2 COVID-19 AMP PRB: CPT

## 2024-07-11 PROCEDURE — 87804 INFLUENZA ASSAY W/OPTIC: CPT

## 2024-07-11 PROCEDURE — 6370000000 HC RX 637 (ALT 250 FOR IP)

## 2024-07-11 RX ORDER — ACETAMINOPHEN 500 MG
1000 TABLET ORAL
Status: COMPLETED | OUTPATIENT
Start: 2024-07-11 | End: 2024-07-11

## 2024-07-11 RX ORDER — IBUPROFEN 400 MG/1
600 TABLET ORAL
Status: COMPLETED | OUTPATIENT
Start: 2024-07-11 | End: 2024-07-11

## 2024-07-11 RX ADMIN — ACETAMINOPHEN 1000 MG: 500 TABLET ORAL at 08:48

## 2024-07-11 RX ADMIN — IBUPROFEN 600 MG: 400 TABLET, FILM COATED ORAL at 08:48

## 2024-07-11 ASSESSMENT — ENCOUNTER SYMPTOMS
SHORTNESS OF BREATH: 1
COUGH: 1
SORE THROAT: 1
WHEEZING: 0
ABDOMINAL PAIN: 0
VOMITING: 0
RHINORRHEA: 1
DIARRHEA: 0
CONSTIPATION: 0
NAUSEA: 0

## 2024-07-11 ASSESSMENT — PAIN - FUNCTIONAL ASSESSMENT: PAIN_FUNCTIONAL_ASSESSMENT: NONE - DENIES PAIN

## 2024-07-11 NOTE — ED NOTES
Pt to ED for pharyngitis, body aches, nasal congestion, and cough x2 days. Patient alert and oriented x4, talking in complete sentences. Respirations even and unlabored. Call light in reach, all needs met at this time

## 2024-07-11 NOTE — DISCHARGE INSTRUCTIONS
If you are looking to establish care with a new primary care physician, recommendation was given for [facility].  Follow-up instructions are included.  Call the office and schedule an appointment for emergency department follow-up and to establish care.    Take over-the-counter Tylenol and/or ibuprofen as directed for sore throat, fever, and bodyaches.  You can also use Cepacol lozenge or Chloraseptic throat spray to help soothe your throat.    If you were diagnosed with strep throat, make sure that you throw your toothbrush away.    Return to the Emergency Department for fever > 101.5, difficulty with swallowing foods or liquids, excessive nausea or vomiting, difficulty with breathing, any other care or concern.

## 2024-07-11 NOTE — ED PROVIDER NOTES
[facility]  Emergency Department  Faculty Attestation     I performed a history and physical examination of the patient and discussed management with the resident. I reviewed the resident’s note and agree with the documented findings and plan of care. Any areas of disagreement are noted on the chart. I was personally present for the key portions of any procedures. I have documented in the chart those procedures where I was not present during the key portions. I have reviewed the emergency nurses triage note. I agree with the chief complaint, past medical history, past surgical history, allergies, medications, social and family history as documented unless otherwise noted below.    For Physician Assistant/ Nurse Practitioner cases/documentation I have personally evaluated this patient and have completed at least one if not all key elements of the E/M (history, physical exam, and MDM). Additional findings are as noted.    Preliminary note started at 8:39 AM EDT    Primary Care Physician:  Hernán Donald MD    Screenings:  [unfilled]    CHIEF COMPLAINT       Chief Complaint   Patient presents with    Pharyngitis       RECENT VITALS:   /80   Pulse 86   Temp (!) 96.6 °F (35.9 °C) (Oral)   Resp 22   Ht 1.727 m (5' 8\")   Wt 63.5 kg (140 lb)   SpO2 97%   BMI 21.29 kg/m²     LABS:  Labs Reviewed   RAPID STREP SCREEN   COVID-19, RAPID   RAPID INFLUENZA A/B ANTIGENS       Radiology  No orders to display       Attending Physician Additional  Notes    Patient is been ill for several days, following the illness of his other family members who have similar symptoms.  He had sore throat followed by congestion coughing rhinorrhea myalgias fatigue chills and sweats.  No vomiting diarrhea or abdominal pain.  No rash.  There is mild headache but no stiff neck.  He has history of COVID previously.  He has no other medical illness such as asthma.  On exam is nontoxic afebrile vital

## 2024-07-11 NOTE — ED PROVIDER NOTES
[facility] ED  Emergency Department Encounter  Emergency Medicine Resident     Pt Name:Luis M Key  MRN: [id number]  Birthdate 1975  Date of evaluation: 7/11/24  PCP:  Hernán Donald MD  Note Started: 8:01 AM EDT      CHIEF COMPLAINT       Chief Complaint   Patient presents with    Pharyngitis       HISTORY OF PRESENT ILLNESS  (Location/Symptom, Timing/Onset, Context/Setting, Quality, Duration, Modifying Factors, Severity.)      Luis M Key is a 49 y.o. male who presents with complaints of a sore throat since yesterday.  Patient states that he went to work yesterday but was unable this morning.  Patient woke up feeling much worse this morning with fever, chills, nausea, cough.  He reports having a very runny nose and coughing up phlegm.  Patient states that his entire family is sick at home.  Patient takes no medications, denies any past medical history, and has not seen a PCP in years.    PAST MEDICAL / SURGICAL / SOCIAL / FAMILY HISTORY      has no past medical history on file.     has a past surgical history that includes Appendectomy; Inguinal hernia repair (Right, 07/07/2023); and hernia repair (Right, 7/7/2023).    Social History     Socioeconomic History    Marital status: Single     Spouse name: Not on file    Number of children: Not on file    Years of education: Not on file    Highest education level: Not on file   Occupational History    Not on file   Tobacco Use    Smoking status: Unknown    Smokeless tobacco: Not on file   Vaping Use    Vaping Use: Never used   Substance and Sexual Activity    Alcohol use: Never    Drug use: Never    Sexual activity: Not on file   Other Topics Concern    Not on file   Social History Narrative    Not on file     Social Determinants of Health     Financial Resource Strain: Not on file   Food Insecurity: Not on file   Transportation Needs: Not on file   Physical Activity: Not on file   Stress: Not on file   Social Connections: Not on file  erythematous.      Left Ear: Tympanic membrane normal. No drainage. Tympanic membrane is not erythematous.      Nose: Congestion present.      Mouth/Throat:      Mouth: Mucous membranes are moist. No oral lesions.      Pharynx: Oropharynx is clear. Posterior oropharyngeal erythema present. No pharyngeal swelling or oropharyngeal exudate.   Eyes:      Conjunctiva/sclera: Conjunctivae normal.   Neck:      Trachea: No tracheal deviation.   Cardiovascular:      Rate and Rhythm: Normal rate and regular rhythm.      Heart sounds: Normal heart sounds. No murmur heard.     Comments: Radial pulses 2+ bilaterally  Pulmonary:      Effort: Pulmonary effort is normal. No respiratory distress.      Breath sounds: Normal breath sounds. No stridor. No wheezing or rales.   Abdominal:      General: Bowel sounds are normal. There is no distension.      Palpations: Abdomen is soft. There is no mass.      Tenderness: There is no abdominal tenderness. There is no guarding or rebound.   Musculoskeletal:         General: Normal range of motion.      Cervical back: Neck supple.   Skin:     General: Skin is warm and dry.      Findings: No erythema or rash.   Neurological:      General: No focal deficit present.      Mental Status: He is alert and oriented to person, place, and time.   Psychiatric:         Behavior: Behavior normal.     Osteopathic:     Deferred        DDX/DIAGNOSTIC RESULTS / EMERGENCY DEPARTMENT COURSE / MDM     Medical Decision Making  Patient has had a sore throat since yesterday, but has been feeling generally unwell for the past couple of days.  He has had a sore throat followed by congestion, rhinorrhea, cough, myalgias, fever, chills, and fatigue.  Patient denies any diarrhea, constipation, or abdominal pain.  Patient has no past medical history and takes no medications daily.  On exam, patient is afebrile, in no acute distress, and vital signs are normal.  Patient's oropharynx is slightly erythematous, however there

## 2024-11-21 ENCOUNTER — HOSPITAL ENCOUNTER (EMERGENCY)
Age: 49
Discharge: HOME | End: 2024-11-21
Attending: STUDENT IN AN ORGANIZED HEALTH CARE EDUCATION/TRAINING PROGRAM
Payer: COMMERCIAL

## 2024-11-21 ENCOUNTER — APPOINTMENT (OUTPATIENT)
Dept: CT IMAGING | Age: 49
End: 2024-11-21
Payer: COMMERCIAL

## 2024-11-21 ENCOUNTER — APPOINTMENT (OUTPATIENT)
Dept: MRI IMAGING | Age: 49
End: 2024-11-21
Payer: COMMERCIAL

## 2024-11-21 VITALS
SYSTOLIC BLOOD PRESSURE: 128 MMHG | OXYGEN SATURATION: 99 % | DIASTOLIC BLOOD PRESSURE: 108 MMHG | RESPIRATION RATE: 18 BRPM | HEART RATE: 94 BPM | TEMPERATURE: 98.1 F

## 2024-11-21 DIAGNOSIS — R29.90 STROKE-LIKE SYMPTOMS: Primary | ICD-10-CM

## 2024-11-21 LAB
ABO + RH BLD: NORMAL
AMPHET UR QL SCN: POSITIVE
ANION GAP SERPL CALCULATED.3IONS-SCNC: 9 MMOL/L (ref 9–16)
ARM BAND NUMBER: NORMAL
BACTERIA URNS QL MICRO: ABNORMAL
BARBITURATES UR QL SCN: NEGATIVE
BASOPHILS # BLD: 0.05 K/UL (ref 0–0.2)
BASOPHILS NFR BLD: 1 % (ref 0–2)
BENZODIAZ UR QL: NEGATIVE
BILIRUB UR QL STRIP: NEGATIVE
BLOOD BANK SAMPLE EXPIRATION: NORMAL
BLOOD GROUP ANTIBODIES SERPL: NEGATIVE
BUN BLD-MCNC: 19 MG/DL (ref 8–26)
BUN SERPL-MCNC: 19 MG/DL (ref 6–20)
CA-I BLD-SCNC: 1.23 MMOL/L (ref 1.15–1.33)
CALCIUM SERPL-MCNC: 8.8 MG/DL (ref 8.6–10.4)
CANNABINOIDS UR QL SCN: NEGATIVE
CASTS #/AREA URNS LPF: ABNORMAL /LPF (ref 0–8)
CHLORIDE BLD-SCNC: 100 MMOL/L (ref 98–107)
CHLORIDE SERPL-SCNC: 101 MMOL/L (ref 98–107)
CK SERPL-CCNC: 84 U/L (ref 39–308)
CLARITY UR: CLEAR
CO2 BLD CALC-SCNC: 34 MMOL/L (ref 22–30)
CO2 SERPL-SCNC: 29 MMOL/L (ref 20–31)
COCAINE UR QL SCN: NEGATIVE
COLOR UR: YELLOW
CREAT SERPL-MCNC: 1.1 MG/DL (ref 0.7–1.2)
EGFR, POC: >90 ML/MIN/1.73M2
EOSINOPHIL # BLD: 0.14 K/UL (ref 0–0.44)
EOSINOPHILS RELATIVE PERCENT: 2 % (ref 1–4)
EPI CELLS #/AREA URNS HPF: ABNORMAL /HPF (ref 0–5)
ERYTHROCYTE [DISTWIDTH] IN BLOOD BY AUTOMATED COUNT: 11.9 % (ref 11.8–14.4)
FENTANYL UR QL: NEGATIVE
GFR, ESTIMATED: 82 ML/MIN/1.73M2
GLUCOSE BLD-MCNC: 136 MG/DL (ref 74–100)
GLUCOSE SERPL-MCNC: 120 MG/DL (ref 74–99)
GLUCOSE UR STRIP-MCNC: NEGATIVE MG/DL
HCO3 VENOUS: 33 MMOL/L (ref 22–29)
HCT VFR BLD AUTO: 46 % (ref 41–53)
HCT VFR BLD AUTO: 48.4 % (ref 40.7–50.3)
HGB BLD-MCNC: 16.5 G/DL (ref 13–17)
HGB UR QL STRIP.AUTO: NEGATIVE
IMM GRANULOCYTES # BLD AUTO: 0.05 K/UL (ref 0–0.3)
IMM GRANULOCYTES NFR BLD: 1 %
INR PPP: 1
KETONES UR STRIP-MCNC: NEGATIVE MG/DL
LEUKOCYTE ESTERASE UR QL STRIP: NEGATIVE
LYMPHOCYTES NFR BLD: 1.19 K/UL (ref 1.1–3.7)
LYMPHOCYTES RELATIVE PERCENT: 17 % (ref 24–43)
MCH RBC QN AUTO: 30.6 PG (ref 25.2–33.5)
MCHC RBC AUTO-ENTMCNC: 34.1 G/DL (ref 28.4–34.8)
MCV RBC AUTO: 89.8 FL (ref 82.6–102.9)
METHADONE UR QL: NEGATIVE
MONOCYTES NFR BLD: 1.19 K/UL (ref 0.1–1.2)
MONOCYTES NFR BLD: 17 % (ref 3–12)
MYOGLOBIN SERPL-MCNC: 39 NG/ML (ref 28–72)
NEUTROPHILS NFR BLD: 62 % (ref 36–65)
NEUTS SEG NFR BLD: 4.59 K/UL (ref 1.5–8.1)
NITRITE UR QL STRIP: NEGATIVE
NRBC BLD-RTO: 0 PER 100 WBC
O2 SAT, VEN: 39.6 % (ref 60–85)
OPIATES UR QL SCN: NEGATIVE
OXYCODONE UR QL SCN: NEGATIVE
PARTIAL THROMBOPLASTIN TIME: 25 SEC (ref 23–36.5)
PCO2 VENOUS: 58.1 MM HG (ref 41–51)
PCP UR QL SCN: NEGATIVE
PH UR STRIP: 7 [PH] (ref 5–8)
PH VENOUS: 7.36 (ref 7.32–7.43)
PLATELET # BLD AUTO: 252 K/UL (ref 138–453)
PMV BLD AUTO: 9.5 FL (ref 8.1–13.5)
PO2 VENOUS: 24.4 MM HG (ref 30–50)
POC ANION GAP: 9 MMOL/L (ref 7–16)
POC CREATININE: 0.9 MG/DL (ref 0.51–1.19)
POC HEMOGLOBIN (CALC): 15.6 G/DL (ref 13.5–17.5)
POC LACTIC ACID: 1.2 MMOL/L (ref 0.56–1.39)
POSITIVE BASE EXCESS, VEN: 5.6 MMOL/L (ref 0–3)
POTASSIUM BLD-SCNC: 3.9 MMOL/L (ref 3.5–4.5)
POTASSIUM SERPL-SCNC: 3.9 MMOL/L (ref 3.7–5.3)
PROT UR STRIP-MCNC: NEGATIVE MG/DL
PROTHROMBIN TIME: 13.2 SEC (ref 11.7–14.9)
RBC # BLD AUTO: 5.39 M/UL (ref 4.21–5.77)
RBC #/AREA URNS HPF: ABNORMAL /HPF (ref 0–4)
SODIUM BLD-SCNC: 142 MMOL/L (ref 138–146)
SODIUM SERPL-SCNC: 139 MMOL/L (ref 136–145)
SP GR UR STRIP: 1.03 (ref 1–1.03)
TEST INFORMATION: ABNORMAL
TROPONIN I SERPL HS-MCNC: <6 NG/L (ref 0–22)
UROBILINOGEN UR STRIP-ACNC: NORMAL EU/DL (ref 0–1)
WBC #/AREA URNS HPF: ABNORMAL /HPF (ref 0–5)
WBC OTHER # BLD: 7.2 K/UL (ref 3.5–11.3)

## 2024-11-21 PROCEDURE — 93005 ELECTROCARDIOGRAM TRACING: CPT | Performed by: STUDENT IN AN ORGANIZED HEALTH CARE EDUCATION/TRAINING PROGRAM

## 2024-11-21 PROCEDURE — 84520 ASSAY OF UREA NITROGEN: CPT

## 2024-11-21 PROCEDURE — 80048 BASIC METABOLIC PNL TOTAL CA: CPT

## 2024-11-21 PROCEDURE — 84484 ASSAY OF TROPONIN QUANT: CPT

## 2024-11-21 PROCEDURE — 99285 EMERGENCY DEPT VISIT HI MDM: CPT | Performed by: STUDENT IN AN ORGANIZED HEALTH CARE EDUCATION/TRAINING PROGRAM

## 2024-11-21 PROCEDURE — 80051 ELECTROLYTE PANEL: CPT

## 2024-11-21 PROCEDURE — 82330 ASSAY OF CALCIUM: CPT

## 2024-11-21 PROCEDURE — 83605 ASSAY OF LACTIC ACID: CPT

## 2024-11-21 PROCEDURE — 82550 ASSAY OF CK (CPK): CPT

## 2024-11-21 PROCEDURE — 70450 CT HEAD/BRAIN W/O DYE: CPT

## 2024-11-21 PROCEDURE — 70551 MRI BRAIN STEM W/O DYE: CPT

## 2024-11-21 PROCEDURE — 80307 DRUG TEST PRSMV CHEM ANLYZR: CPT

## 2024-11-21 PROCEDURE — 86901 BLOOD TYPING SEROLOGIC RH(D): CPT

## 2024-11-21 PROCEDURE — 85025 COMPLETE CBC W/AUTO DIFF WBC: CPT

## 2024-11-21 PROCEDURE — 6360000004 HC RX CONTRAST MEDICATION: Performed by: STUDENT IN AN ORGANIZED HEALTH CARE EDUCATION/TRAINING PROGRAM

## 2024-11-21 PROCEDURE — 81001 URINALYSIS AUTO W/SCOPE: CPT

## 2024-11-21 PROCEDURE — 83874 ASSAY OF MYOGLOBIN: CPT

## 2024-11-21 PROCEDURE — 86900 BLOOD TYPING SEROLOGIC ABO: CPT

## 2024-11-21 PROCEDURE — 82803 BLOOD GASES ANY COMBINATION: CPT

## 2024-11-21 PROCEDURE — 82947 ASSAY GLUCOSE BLOOD QUANT: CPT

## 2024-11-21 PROCEDURE — 86850 RBC ANTIBODY SCREEN: CPT

## 2024-11-21 PROCEDURE — 85730 THROMBOPLASTIN TIME PARTIAL: CPT

## 2024-11-21 PROCEDURE — 82565 ASSAY OF CREATININE: CPT

## 2024-11-21 PROCEDURE — 85014 HEMATOCRIT: CPT

## 2024-11-21 PROCEDURE — 85610 PROTHROMBIN TIME: CPT

## 2024-11-21 PROCEDURE — 70498 CT ANGIOGRAPHY NECK: CPT

## 2024-11-21 PROCEDURE — 82553 CREATINE MB FRACTION: CPT

## 2024-11-21 RX ORDER — IOPAMIDOL 755 MG/ML
90 INJECTION, SOLUTION INTRAVASCULAR
Status: COMPLETED | OUTPATIENT
Start: 2024-11-21 | End: 2024-11-21

## 2024-11-21 RX ORDER — ASPIRIN 81 MG/1
81 TABLET ORAL DAILY
Qty: 90 TABLET | Refills: 0 | Status: SHIPPED | OUTPATIENT
Start: 2024-11-21

## 2024-11-21 RX ADMIN — IOPAMIDOL 90 ML: 755 INJECTION, SOLUTION INTRAVENOUS at 14:19

## 2024-11-21 ASSESSMENT — ENCOUNTER SYMPTOMS
SHORTNESS OF BREATH: 0
ABDOMINAL PAIN: 0
ABDOMINAL DISTENTION: 0
DIARRHEA: 0
NAUSEA: 0
BACK PAIN: 0
PHOTOPHOBIA: 0
CONSTIPATION: 0
RHINORRHEA: 0
COLOR CHANGE: 0
COUGH: 0

## 2024-11-21 ASSESSMENT — LIFESTYLE VARIABLES
HOW OFTEN DO YOU HAVE A DRINK CONTAINING ALCOHOL: NEVER
HOW MANY STANDARD DRINKS CONTAINING ALCOHOL DO YOU HAVE ON A TYPICAL DAY: PATIENT DOES NOT DRINK

## 2024-11-21 ASSESSMENT — PAIN - FUNCTIONAL ASSESSMENT: PAIN_FUNCTIONAL_ASSESSMENT: NONE - DENIES PAIN

## 2024-11-21 NOTE — ED PROVIDER NOTES
activity: Not on file   Other Topics Concern    Not on file   Social History Narrative    Not on file     Social Determinants of Health     Financial Resource Strain: Not on file   Food Insecurity: No Food Insecurity (2/10/2024)    [facility]    Hunger Screening     Within the past 12 months we worried whether our food would run out before we got money to buy more.: Never True     Within the past 12 months the food we bought just didn't last and we didn't have money to get more.: Never True   Transportation Needs: Not on file   Physical Activity: Not on file   Stress: Not on file   Social Connections: Not on file   Intimate Partner Violence: Not on file   Housing Stability: Not on file       No family history on file.    Allergies:  Patient has no known allergies.    Home Medications:  Prior to Admission medications    Medication Sig Start Date End Date Taking? Authorizing Provider   aspirin 81 MG EC tablet Take 1 tablet by mouth daily 11/21/24  Yes Enedina Valenzuela MD   melatonin (RA MELATONIN) 3 MG TABS tablet Take 1 tablet by mouth daily 30 mins before going to sleep 2/9/24 10/6/24  Hernán Donald MD   acetaminophen (TYLENOL) 500 MG tablet Take 2 tablets by mouth 3 times daily 1/16/24   Tom Vargas, DO   ibuprofen (ADVIL;MOTRIN) 800 MG tablet Take 1 tablet by mouth every 8 hours as needed for Pain 1/16/24   Tom Vargas, DO       REVIEW OF SYSTEMS       See HPI    PHYSICAL EXAM      INITIAL VITALS:   BP (!) 128/108   Pulse 94   Temp 98.1 °F (36.7 °C) (Oral)   Resp 18   SpO2 99%     Physical Exam  Constitutional:       Appearance: Normal appearance.   HENT:      Head: Normocephalic and atraumatic.      Right Ear: Tympanic membrane normal.      Left Ear: Tympanic membrane normal.      Mouth/Throat:      Mouth: Mucous membranes are moist.      Pharynx: Oropharynx is clear.   Eyes:      General: No visual field deficit.     Extraocular Movements:  responsive, GCS 15.  No visual field deficits.  No aphasia.  He has mild dysarthria.  Pupils are 3 mm and reactive to light bilaterally.  No abnormal extraocular movements.  He has motor 5/5 strength in all 4 extremities.  He does have diminished sensation on the right side of his face, right upper arm and right lower leg when compared to the left.  He does not have any pronator drift.  No ataxia.  See NIH below.  Remainder physical examination benign as documented above.    Concern for acute CVA.  Hypoglycemia ruled out.  Stroke alert critical called.  See ED course documentation.  Consider also TIA, electrolyte abnormality, possible substance abuse.  Will plan for CT imaging, stroke/neurology consult.    INITIAL NIH STROKE SCALE    Time Performed:  1354 PM     1a.  Level of consciousness:  0 - alert; keenly responsive  1b.  Level of consciousness questions:  0 - answers both questions correctly  1c.  Level of consciousness questions:  0 - performs both tasks correctly  2.    Best Gaze:  0 - normal  3.    Visual:  0 - no visual loss  4.    Facial Palsy:  0 - normal symmetric movement  5a.  Motor left arm:  0 - no drift, limb holds 90 (or 45) degrees for full 10 seconds  5b.  Motor right arm:  0 - no drift, limb holds 90 (or 45) degrees for full 10 seconds  6a.  Motor left le - no drift; leg holds 30 degree position for full 5 seconds  6b.  Motor right le - no drift; leg holds 30 degree position for full 5 seconds  7.    Limb Ataxia:  0 - absent  8.    Sensory:  1 - mild to moderate sensory loss; patient feels pinprick is less sharp or is dull on the affected side; there is a loss of superficial pain with pinprick but patient is aware of being touched   9.    Best Language:  0 - no aphasia, normal  10.  Dysarthria:  1 - mild to moderate, patient slurs at least some words and at worst, can be understood with some difficulty  11.  Extinction and Inattention:  0 - no abnormality    TOTAL:  2    EKG  EKG

## 2024-11-21 NOTE — ED NOTES
Speech is  clear. Marked improvement in speech . Not grinding his teeth . Speaking in full sentences

## 2024-11-21 NOTE — ED NOTES
Pt was describing the symptoms when he first arrived as \" tingling and numbness \" sensation that stayed with him until after CT. He was not on board with giving TNK. Wanted the CT to be read first to make sure it was stroke for sure. By was alert and oriented and within his own faculties the entire time. MD reassessed his \" numbness\" after CT and he stated that it had resolved at that time . No other symptoms have been noted since arrival

## 2024-11-21 NOTE — ED NOTES
Pt states was in an apartment building that had thick white powder all over the room , and it came in contact with his face and he feels that he inhaled the unknown substance. He has a right sided mouth droop bc he is grinding his teeth so hard but otherwise when he puffs his cheeks and lifts his forehead he has no facial droop. He has some mild sensation differentiation on his arms and his speech is altered but clear enough to understand. He states though that he \" feel weird and have never felt like this before\". Pt is alert and oriented and following all commands.

## 2024-11-21 NOTE — ED PROVIDER NOTES
[facility]     Emergency Department     Faculty Attestation    I performed a history and physical examination of the patient and discussed management with the resident. I have reviewed and agree with the resident’s findings including all diagnostic interpretations, and treatment plans as written at the time of my review. Any areas of disagreement are noted on the chart. I was personally present for the key portions of any procedures. I have documented in the chart those procedures where I was not present during the key portions. For Physician Assistant/ Nurse Practitioner cases/documentation I have personally evaluated this patient and have completed at least one if not all key elements of the E/M (history, physical exam, and MDM). Additional findings are as noted.    PtName: Luis M Key  MRN: [id number]  Birthdate 1975  Date of evaluation: 11/21/24  Note Started: 1:48 PM EST    Primary Care Physician: Hernán Donald MD    Brief HPI:  49-year-old male presents emergency department with strokelike symptoms.  Reports acute onset dysarthria, aphasia, right-sided weakness and numbness.  Symptoms started about 20 minutes prior to presentation.    Pertinent Physical Exam Findings:  Vitals:    11/21/24 1342   BP: 127/89   Pulse: 98   Resp: 18   Temp: 98.1 °F (36.7 °C)   SpO2: 95%   Aphasia, dysarthria, right-sided numbness and weakness.    Medical Decision Making: Patient is a 49 y.o. male presenting to the emergency department with strokelike symptoms. The chart was reviewed for pertinent history relating to the chief complaint.  Critical stroke alert was called.  Neurology is at bedside evaluating the patient.      All results, including labs (if ordered), imaging (if ordered), and EKGs (if ordered) were independently interpreted by me.  See radiologist report for additional details on imaging studies.      (Please note that portions of this note were completed with  a voice recognition program.  Efforts were made to edit the dictations but occasionally words are mis-transcribed.)    Tom Lyles DO   Attending Emergency Medicine Physician         Tom Lyles DO  11/21/24 1974

## 2024-11-21 NOTE — CONSULTS
Stroke Neurology Consult Note  Stroke Alert @ 1:45 PM  Arrival at bedside @ 1:45 PM    Reason for Consult:  ED Stroke Alert  Requesting Physician:  ED team   Endovascular Neurosurgeon: Dr. Dela Cruz   Stroke Team: Alexis Puente MD  History Obtained From:  patient  Chief Complaint:  Acute neurological deficits   Allergies:  Patient has no known allergies.    History of Presenting Illness     Luis M Key is a 49 y.o. male with a history of drug use, who presents with acute onset slurred speech, perioral tingling and right upper extremity tingling    Was working in an apartment, seems like there was a chemical powder scattered to treat bed bugs, patient might have introduced it to his mouth by mistake and in few minutes he started to have perioral tingling, right facial and hand tingling, associated with dizziness and slight headache. Patient was having mouth grinding and slurred speech that was noted by his friend as well. Was brought in to ED for evaluation     Patient seemed to be hypervigilant and anxious due to speech disturbance and tingling of his mouth.  Patient constantly is asking if \" heroin or fentanyl\" can be possible contamination from exposure to the this unknown powder.  Patient states that he has not used drugs in a while.      LKW: 1:25 PM  NIHSS: 2  Modified Tyler Scale: 0  SBP: 127/89  Glucose: 136  CT head without contrast: Question for possible hypodensity at the left caudate head  TNK: Low NIH and rapidly improving symptoms  Endovascular: Not indicated    Review of Systems   Constitutional:  Negative for fatigue, fever and unexpected weight change.   HENT:  Negative for rhinorrhea.    Eyes:  Negative for photophobia.   Respiratory:  Negative for cough and shortness of breath.    Cardiovascular:  Negative for chest pain, palpitations and leg swelling.   Gastrointestinal:  Negative for abdominal distention, abdominal pain, constipation, diarrhea and nausea.   Endocrine: Negative for polyuria.

## 2024-11-21 NOTE — ED NOTES
Ambulatory to restroom with a steady gait . Speech is almost to baseline per pt, has no other deficits

## 2024-11-21 NOTE — PROGRESS NOTES
[facility] [place]    PATIENT NAME: Luis M Key    Shift date: 11/21/2024   Shift day: Thursday   Shift # 1    Room # 44/44   Name: Luis M Key            Age: 49 y.o.  Gender: male          Religious: None   [place] of [place]:     Trauma/Incident type: Stroke Alert  Admit Date & Time: 11/21/2024  1:36 PM    PATIENT/EVENT DESCRIPTION:  Luis M Key is a 49 y.o. 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 He arrived to ED with stroke-like symptoms. Pt to be admitted to 44/44.         SPIRITUAL ASSESSMENT-INTERVENTION-OUTCOME:  Pt as pushed to CT. Pt's visitor told writer that she manages a property down the street and needed to go down there. She said she was taking pt's wallet and phone and would return shortly. Writer relayed that message to pt once he came back to room. Pt's speech was still off at that time. 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 Pt shared his experience and said someone knows that he is here. His emergency contact is his ex-girlfriend. He said they are . Pt expressed a deep gratitude for the respect and care he has received here. Pt's speech improved during writer's visit. Writer provided support through active listening and words of affirmation.      PATIENT BELONGINGS:  With patient    ANY BELONGINGS OF SIGNIFICANT VALUE NOTED:  Wallet and phone were taken by .    REGISTRATION STAFF NOTIFIED?  No      WHAT IS YOUR SPIRITUAL CARE PLAN FOR THIS PATIENT?:   Spiritual health team will remain available for spiritual and emotional support.     Electronically signed by Chaplain Herminio, on 11/21/2024 at 2:45 PM.  [facility]  [phone]

## 2024-11-21 NOTE — DISCHARGE INSTRUCTIONS
You were seen in the emergency department today for concern of possible strokelike symptoms.  We did obtain CTs of your head as well as an MRI.  Neurology evaluated you and recommended that you be admitted to the hospital for further stroke workup.  However, you did not want to be admitted.  We will start you on a medication called aspirin which you should take every day.  We will also have you follow-up with the neurologist in their office outpatient.  Their information is listed below.  Please call to schedule an appointment.  Please return for any worsening symptoms such as vision changes, severe headache, weakness, numbness, tingling, difficulty speaking, difficulty walking, or any other concerns or symptoms.  Of note, you were positive for methamphetamine.

## 2024-11-21 NOTE — ED PROVIDER NOTES
[facility]  FACULTY HANDOFF       Handoff taken on the following patient from prior Attending Physician:  Pt Name: Luis M Shahid  PCP:  Hernán Donald MD    Attestation  I was available and discussed any additional care issues that arose and coordinated the management plans with the resident(s) caring for the patient during my duty period. Any areas of disagreement with resident's documentation of care or procedures are noted on the chart. I was personally present for the key portions of any/all procedures during my duty period. I have documented in the chart those procedures where I was not present during the key portions.          Nemesio Todd MD  11/21/24 2024

## 2024-11-22 LAB
EKG ATRIAL RATE: 97 BPM
EKG P AXIS: 65 DEGREES
EKG P-R INTERVAL: 138 MS
EKG Q-T INTERVAL: 340 MS
EKG QRS DURATION: 82 MS
EKG QTC CALCULATION (BAZETT): 431 MS
EKG R AXIS: 32 DEGREES
EKG T AXIS: 29 DEGREES
EKG VENTRICULAR RATE: 97 BPM

## 2025-02-23 ENCOUNTER — APPOINTMENT (OUTPATIENT)
Dept: GENERAL RADIOLOGY | Age: 50
End: 2025-02-23

## 2025-02-23 ENCOUNTER — APPOINTMENT (OUTPATIENT)
Dept: CT IMAGING | Age: 50
End: 2025-02-23

## 2025-02-23 ENCOUNTER — HOSPITAL ENCOUNTER (EMERGENCY)
Age: 50
Discharge: HOME | End: 2025-02-23
Attending: EMERGENCY MEDICINE

## 2025-02-23 VITALS
TEMPERATURE: 98.1 F | RESPIRATION RATE: 18 BRPM | HEART RATE: 62 BPM | DIASTOLIC BLOOD PRESSURE: 100 MMHG | OXYGEN SATURATION: 100 % | SYSTOLIC BLOOD PRESSURE: 128 MMHG

## 2025-02-23 DIAGNOSIS — R10.31 RIGHT LOWER QUADRANT ABDOMINAL PAIN: Primary | ICD-10-CM

## 2025-02-23 DIAGNOSIS — N20.0 LEFT NEPHROLITHIASIS: ICD-10-CM

## 2025-02-23 LAB
ALBUMIN SERPL-MCNC: 4.7 G/DL (ref 3.5–5.2)
ALBUMIN/GLOB SERPL: 1.7 {RATIO} (ref 1–2.5)
ALP SERPL-CCNC: 77 U/L (ref 40–129)
ALT SERPL-CCNC: 29 U/L (ref 10–50)
ANION GAP SERPL CALCULATED.3IONS-SCNC: 12 MMOL/L (ref 9–16)
AST SERPL-CCNC: 24 U/L (ref 10–50)
BASOPHILS # BLD: 0.06 K/UL (ref 0–0.2)
BASOPHILS NFR BLD: 1 % (ref 0–2)
BILIRUB SERPL-MCNC: 0.6 MG/DL (ref 0–1.2)
BILIRUB UR QL STRIP: NEGATIVE
BUN SERPL-MCNC: 19 MG/DL (ref 6–20)
CALCIUM SERPL-MCNC: 10 MG/DL (ref 8.6–10.4)
CHLORIDE SERPL-SCNC: 102 MMOL/L (ref 98–107)
CLARITY UR: CLEAR
CO2 SERPL-SCNC: 27 MMOL/L (ref 20–31)
COLOR UR: YELLOW
COMMENT: NORMAL
CREAT SERPL-MCNC: 1 MG/DL (ref 0.7–1.2)
EOSINOPHIL # BLD: 0.07 K/UL (ref 0–0.44)
EOSINOPHILS RELATIVE PERCENT: 1 % (ref 1–4)
ERYTHROCYTE [DISTWIDTH] IN BLOOD BY AUTOMATED COUNT: 11.4 % (ref 11.8–14.4)
GFR, ESTIMATED: >90 ML/MIN/1.73M2
GLUCOSE SERPL-MCNC: 91 MG/DL (ref 74–99)
GLUCOSE UR STRIP-MCNC: NEGATIVE MG/DL
HCT VFR BLD AUTO: 45.4 % (ref 40.7–50.3)
HGB BLD-MCNC: 16.1 G/DL (ref 13–17)
HGB UR QL STRIP.AUTO: NEGATIVE
IMM GRANULOCYTES # BLD AUTO: 0.03 K/UL (ref 0–0.3)
IMM GRANULOCYTES NFR BLD: 1 %
KETONES UR STRIP-MCNC: NEGATIVE MG/DL
LEUKOCYTE ESTERASE UR QL STRIP: NEGATIVE
LYMPHOCYTES NFR BLD: 1.27 K/UL (ref 1.1–3.7)
LYMPHOCYTES RELATIVE PERCENT: 21 % (ref 24–43)
MCH RBC QN AUTO: 31 PG (ref 25.2–33.5)
MCHC RBC AUTO-ENTMCNC: 35.5 G/DL (ref 28.4–34.8)
MCV RBC AUTO: 87.5 FL (ref 82.6–102.9)
MONOCYTES NFR BLD: 0.6 K/UL (ref 0.1–1.2)
MONOCYTES NFR BLD: 10 % (ref 3–12)
NEUTROPHILS NFR BLD: 66 % (ref 36–65)
NEUTS SEG NFR BLD: 3.95 K/UL (ref 1.5–8.1)
NITRITE UR QL STRIP: NEGATIVE
NRBC BLD-RTO: 0 PER 100 WBC
PH UR STRIP: 7.5 [PH] (ref 5–8)
PLATELET # BLD AUTO: 242 K/UL (ref 138–453)
PMV BLD AUTO: 9.8 FL (ref 8.1–13.5)
POTASSIUM SERPL-SCNC: 3.7 MMOL/L (ref 3.7–5.3)
PROT SERPL-MCNC: 7.5 G/DL (ref 6.6–8.7)
PROT UR STRIP-MCNC: NEGATIVE MG/DL
RBC # BLD AUTO: 5.19 M/UL (ref 4.21–5.77)
SODIUM SERPL-SCNC: 141 MMOL/L (ref 136–145)
SP GR UR STRIP: 1.01 (ref 1–1.03)
TROPONIN I SERPL HS-MCNC: <6 NG/L (ref 0–22)
UROBILINOGEN UR STRIP-ACNC: NORMAL EU/DL (ref 0–1)
WBC OTHER # BLD: 6 K/UL (ref 3.5–11.3)

## 2025-02-23 PROCEDURE — 85025 COMPLETE CBC W/AUTO DIFF WBC: CPT

## 2025-02-23 PROCEDURE — 81003 URINALYSIS AUTO W/O SCOPE: CPT

## 2025-02-23 PROCEDURE — 84484 ASSAY OF TROPONIN QUANT: CPT

## 2025-02-23 PROCEDURE — 6360000002 HC RX W HCPCS: Performed by: EMERGENCY MEDICINE

## 2025-02-23 PROCEDURE — 93005 ELECTROCARDIOGRAM TRACING: CPT | Performed by: EMERGENCY MEDICINE

## 2025-02-23 PROCEDURE — 96374 THER/PROPH/DIAG INJ IV PUSH: CPT

## 2025-02-23 PROCEDURE — 2580000003 HC RX 258: Performed by: EMERGENCY MEDICINE

## 2025-02-23 PROCEDURE — 99285 EMERGENCY DEPT VISIT HI MDM: CPT

## 2025-02-23 PROCEDURE — 71045 X-RAY EXAM CHEST 1 VIEW: CPT

## 2025-02-23 PROCEDURE — 74176 CT ABD & PELVIS W/O CONTRAST: CPT

## 2025-02-23 PROCEDURE — 80053 COMPREHEN METABOLIC PANEL: CPT

## 2025-02-23 RX ORDER — POLYETHYLENE GLYCOL 3350 17 G/17G
17 POWDER, FOR SOLUTION ORAL DAILY PRN
Qty: 7 PACKET | Refills: 0 | Status: SHIPPED | OUTPATIENT
Start: 2025-02-23 | End: 2025-03-02

## 2025-02-23 RX ORDER — 0.9 % SODIUM CHLORIDE 0.9 %
1000 INTRAVENOUS SOLUTION INTRAVENOUS ONCE
Status: COMPLETED | OUTPATIENT
Start: 2025-02-23 | End: 2025-02-23

## 2025-02-23 RX ORDER — PANTOPRAZOLE SODIUM 20 MG/1
20 TABLET, DELAYED RELEASE ORAL DAILY
Qty: 30 TABLET | Refills: 0 | Status: SHIPPED | OUTPATIENT
Start: 2025-02-23

## 2025-02-23 RX ORDER — KETOROLAC TROMETHAMINE 15 MG/ML
15 INJECTION, SOLUTION INTRAMUSCULAR; INTRAVENOUS ONCE
Status: COMPLETED | OUTPATIENT
Start: 2025-02-23 | End: 2025-02-23

## 2025-02-23 RX ADMIN — SODIUM CHLORIDE 1000 ML: 9 INJECTION, SOLUTION INTRAVENOUS at 13:34

## 2025-02-23 RX ADMIN — KETOROLAC TROMETHAMINE 15 MG: 15 INJECTION, SOLUTION INTRAMUSCULAR; INTRAVENOUS at 13:34

## 2025-02-23 ASSESSMENT — PAIN - FUNCTIONAL ASSESSMENT: PAIN_FUNCTIONAL_ASSESSMENT: 0-10

## 2025-02-23 ASSESSMENT — PAIN SCALES - GENERAL
PAINLEVEL_OUTOF10: 8
PAINLEVEL_OUTOF10: 7

## 2025-02-23 NOTE — ED PROVIDER NOTES
[facility] EMERGENCY DEPARTMENT  eMERGENCY dEPARTMENT eNCOUnter   Attending Attestation     Pt Name: Luis M Key  MRN: [id number]  Birthdate 1975  Date of evaluation: 2/23/25       Luis M Key is a 49 y.o. male who presents with Abdominal Pain and Nausea      2:20 PM EST      History: Patient presents with abdominal pain and nausea.  Patient says has been having pain in his abdomen for several months he says he is concerned about the mesh from his hernia repair.    Exam: Heart rate and rhythm are regular.  Lungs are clear to auscultation bilaterally.  Abdomen is soft and nontender.  Patient is awake and alert and acting appropriately.    Plan for CT.  Will plan for discharge if negative workup.    I performed a history and physical examination of the patient and discussed management with the resident. I reviewed the resident’s note and agree with the documented findings and plan of care. Any areas of disagreement are noted on the chart. I was personally present for the key portions of any procedures. I have documented in the chart those procedures where I was not present during the key portions. I have personally reviewed all images and agree with the resident's interpretation. I have reviewed the emergency nurses triage note. I agree with the chief complaint, past medical history, past surgical history, allergies, medications, social and family history as documented unless otherwise noted below. Documentation of the HPI, Physical Exam and Medical Decision Making performed by medical students or scribes is based on my personal performance of the HPI, PE and MDM. For Phys Assistant/ Nurse Practitioner cases/documentation I have had a face to face evaluation of this patient and have completed at least one if not all key elements of the E/M (history, physical exam, and MDM). Additional findings are as noted.    For APC cases I have personally evaluated and examined the patient in conjunction with the 
  Respiratory:  Negative for shortness of breath.    Cardiovascular:  Negative for chest pain.   Gastrointestinal:  Positive for abdominal pain.       PHYSICAL EXAM      INITIAL VITALS:   BP (!) 128/100   Pulse 62   Temp 98.1 °F (36.7 °C)   Resp 18   SpO2 100%     Physical Exam  Exam conducted with a chaperone present.   Cardiovascular:      Rate and Rhythm: Normal rate and regular rhythm.      Pulses: Normal pulses.   Pulmonary:      Effort: Pulmonary effort is normal.      Breath sounds: Normal breath sounds.   Abdominal:      General: There is no distension.      Palpations: Abdomen is soft.      Tenderness: There is abdominal tenderness. There is no guarding or rebound.      Hernia: There is no hernia in the left inguinal area or right inguinal area.      Comments: Right lower quadrant tenderness on palpation, no mass palpated, no overlying skin changes.   Genitourinary:     Penis: Circumcised.       Testes: Normal.         Right: Mass, tenderness, testicular hydrocele or varicocele not present.         Left: Mass, tenderness, testicular hydrocele or varicocele not present.      Epididymis:      Right: No tenderness.      Left: No tenderness.      Comments: Unremarkable  exam.  Skin:     General: Skin is warm.   Neurological:      Mental Status: He is alert and oriented to person, place, and time.   Psychiatric:         Mood and Affect: Mood normal.       DDX/DIAGNOSTIC RESULTS / EMERGENCY DEPARTMENT COURSE / MDM     Medical Decision Making  49-year-old male, HPI and physical exam documented above.    Differentials include postop complication with mesh, constipation, kidney stone, UTI.  Hernia not palpated.  Testicular exam unremarkable therefore testicular torsion or scrotal hernia unlikely.  Appendicitis unlikely as patient had appendectomy done.  Pancreatitis unlikely as patient is not having any epigastric pain and is not having any vomiting.    Discussed with patient that we will obtain CT abdomen

## 2025-02-23 NOTE — ED NOTES
Dr. Pittman at bedside to evaluate pt.   Pt to ed with c/o abdominal pain and nausea for the past several months. Pt states the pain is RUQ, is constant pain and it is debilitating. Pt states he has nightmares that something awful is happening and he is going to die. Pt denies urinary complaints, states he is nausea but not vomiting. Pt is alert, oriented, speaking in full, complete sentences. Pt rates pain 7/10

## 2025-02-23 NOTE — DISCHARGE INSTRUCTIONS
You were seen here for right lower quadrant abdominal pain.  CT showed no abnormality with your hernia repair.  You were found to have kidney stones involving the left kidney.  You were started on a medication called Protonix, take this daily for abdominal pain.  You were also started on MiraLAX, take this as needed for gas, bloating, constipation.    You need to call and schedule follow-up appointment with a primary care doctor as well as with a GI doctor for soon as possible.    Return to the emergency department immediately if you experience worsening symptoms, develop any other symptoms, or if you have any other concerns.

## 2025-02-25 LAB
EKG ATRIAL RATE: 61 BPM
EKG P AXIS: 22 DEGREES
EKG P-R INTERVAL: 152 MS
EKG Q-T INTERVAL: 412 MS
EKG QRS DURATION: 86 MS
EKG QTC CALCULATION (BAZETT): 414 MS
EKG R AXIS: 13 DEGREES
EKG T AXIS: 20 DEGREES
EKG VENTRICULAR RATE: 61 BPM

## 2025-02-25 PROCEDURE — 93010 ELECTROCARDIOGRAM REPORT: CPT | Performed by: INTERNAL MEDICINE

## 2025-03-04 ENCOUNTER — TELEPHONE (OUTPATIENT)
Dept: GASTROENTEROLOGY | Age: 50
End: 2025-03-04

## 2025-03-04 NOTE — TELEPHONE ENCOUNTER
Patient is requesting a return call to schedule an ED follow up with Dr Richards. [facility]'s ED D/C 2/23/25.    Thank you.

## 2025-03-05 NOTE — TELEPHONE ENCOUNTER
Writer called pt and LVM to call the office to lianet a NP appt with Maurice from the [facility].

## 2025-03-06 ENCOUNTER — TELEPHONE (OUTPATIENT)
Dept: GASTROENTEROLOGY | Age: 50
End: 2025-03-06

## 2025-03-20 ENCOUNTER — OFFICE VISIT (OUTPATIENT)
Dept: FAMILY MEDICINE CLINIC | Age: 50
End: 2025-03-20
Payer: MEDICAID

## 2025-03-20 VITALS
DIASTOLIC BLOOD PRESSURE: 70 MMHG | WEIGHT: 142.2 LBS | HEIGHT: 68 IN | BODY MASS INDEX: 21.55 KG/M2 | SYSTOLIC BLOOD PRESSURE: 126 MMHG

## 2025-03-20 DIAGNOSIS — R10.12 LEFT UPPER QUADRANT ABDOMINAL PAIN: Primary | ICD-10-CM

## 2025-03-20 PROCEDURE — 99213 OFFICE O/P EST LOW 20 MIN: CPT

## 2025-03-20 SDOH — ECONOMIC STABILITY: FOOD INSECURITY: WITHIN THE PAST 12 MONTHS, YOU WORRIED THAT YOUR FOOD WOULD RUN OUT BEFORE YOU GOT MONEY TO BUY MORE.: NEVER TRUE

## 2025-03-20 SDOH — ECONOMIC STABILITY: FOOD INSECURITY: WITHIN THE PAST 12 MONTHS, THE FOOD YOU BOUGHT JUST DIDN'T LAST AND YOU DIDN'T HAVE MONEY TO GET MORE.: NEVER TRUE

## 2025-03-20 ASSESSMENT — PATIENT HEALTH QUESTIONNAIRE - PHQ9
SUM OF ALL RESPONSES TO PHQ QUESTIONS 1-9: 0
1. LITTLE INTEREST OR PLEASURE IN DOING THINGS: NOT AT ALL
2. FEELING DOWN, DEPRESSED OR HOPELESS: NOT AT ALL
SUM OF ALL RESPONSES TO PHQ QUESTIONS 1-9: 0

## 2025-03-20 NOTE — PROGRESS NOTES
Attending Physician Statement  I have discussed the care [facility] and exam findings,  with the resident. I have reviewed the key elements of all parts of the encounter with the resident.  I agree with the assessment, plan and orders as documented by the resident.  (GE Modifier)    S/p Hernia repair c/o abdominal pain- FU with Surgery. Frequent visits to ER work up is WNL.FU with GI recomended  
Visit Information    Have you changed or started any medications since your last visit including any over-the-counter medicines, vitamins, or herbal medicines? no   Have you stopped taking any of your medications? Is so, why? -  no  Are you having any side effects from any of your medications? - no    Have you seen any other physician or provider since your last visit?  no   Have you had any other diagnostic tests since your last visit?  yes - Labs, CT, XR, EKG   Have you been seen in the emergency room and/or had an admission in a hospital since we last saw you?  yes - [facility]   Have you had your routine dental cleaning in the past 6 months?  no     Do you have an active MyChart account? If no, what is the barrier?  Yes    Patient Care Team:  Hernán Donald MD as PCP - General (Family Medicine)    Medical History Review  Past Medical, Family, and Social History reviewed and does contribute to the patient presenting condition    Health Maintenance   Topic Date Due    Hepatitis B vaccine (1 of 3 - 19+ 3-dose series) Never done    DTaP/Tdap/Td vaccine (1 - Tdap) Never done    Flu vaccine (1) Never done    COVID-19 Vaccine (1 - 2024-25 season) Never done    Depression Screen  02/09/2025    Colorectal Cancer Screen  08/07/2025    Lipids  07/29/2027    Hepatitis C screen  Completed    HIV screen  Completed    Hepatitis A vaccine  Aged Out    Hib vaccine  Aged Out    Polio vaccine  Aged Out    Meningococcal (ACWY) vaccine  Aged Out    Meningococcal B vaccine  Aged Out             
        Lab Results   Component Value Date    WBC 6.0 02/23/2025    HGB 16.1 02/23/2025    HCT 45.4 02/23/2025     02/23/2025    CHOL 214 (H) 07/29/2022    TRIG 639 (H) 07/29/2022    HDL 33 (L) 07/29/2022    LDLDIRECT 111 (H) 07/29/2022    ALT 29 02/23/2025    AST 24 02/23/2025     02/23/2025    K 3.7 02/23/2025     02/23/2025    CREATININE 1.0 02/23/2025    BUN 19 02/23/2025    CO2 27 02/23/2025    INR 1.0 11/21/2024     Lab Results   Component Value Date    CALCIUM 10.0 02/23/2025     Lab Results   Component Value Date    LDLDIRECT 111 (H) 07/29/2022       Assessment and Plan:    1. Left upper quadrant abdominal pain  -CT scan unremarkable, all the labs reviewed seems unremarkable  -No weight loss, no loss of appetite, concerns of psychosomatic etiology as well  -Patient has an appointment with gastroenterologist will follow their recommendations as well  -Offered psychiatrist referral          Requested Prescriptions      No prescriptions requested or ordered in this encounter       Medications Discontinued During This Encounter   Medication Reason    acetaminophen (TYLENOL) 500 MG tablet Therapy completed    aspirin 81 MG EC tablet Therapy completed    ibuprofen (ADVIL;MOTRIN) 800 MG tablet Therapy completed    pantoprazole (PROTONIX) 20 MG tablet Therapy completed       Luis M received counseling on the following healthy behaviors: nutrition, exercise and medication adherence    Discussed use,benefit, and side effects of prescribed medications.  Barriers to medication compliance addressed.      All patient questions answered.  Pt voiced understanding.     No follow-ups on file.        Disclaimer: Some orall of this note was transcribed using voice-recognition software.This may cause typographical errors occasionally. Although all effort is made to fix these errors, please do not hesitate to contact our office if there isany concern with the understanding of this note.

## 2025-03-20 NOTE — PATIENT INSTRUCTIONS
Thank you for letting us take care of you today. We hope all your questions were addressed. If a question was overlooked or something else comes to mind after you return home, please contact a member of your Care Team listed below.      Your Care Team at [facility] is Team #  Dima Leach M.D. (Faculty)  Gerardo Ariza M.D. (Resident)  Hernán Donald M.D. (Resident)   Roselyn Fay M.D. (Resident)  Himanshu Polanco M.D. (Resident)  Alexa Myers M.D. (Resident)  Brina Miranda., [facility]  Janna Schmidt, [facility]  Erika Kraus, [facility]  Rah Hobbs, [facility]  Kelsey Kinsey, [facility]  Rosalinda Humphries, [facility]  Wolfgang Tellez (LJ) DANICA Goodman (Clinical Practice Manager)  Vilam Callejas [facility] (Clinical Pharmacist)     Office phone number: [phone]    If you need to get in right away due to illness, please be advised we have \"Same Day\" appointments available Monday-Friday. Please call us at [phone] option #3 to schedule your \"Same Day\" appointment.

## 2025-03-24 ENCOUNTER — TELEPHONE (OUTPATIENT)
Dept: GASTROENTEROLOGY | Age: 50
End: 2025-03-24

## 2025-03-24 ENCOUNTER — OFFICE VISIT (OUTPATIENT)
Dept: GASTROENTEROLOGY | Age: 50
End: 2025-03-24
Payer: MEDICAID

## 2025-03-24 ENCOUNTER — PREP FOR PROCEDURE (OUTPATIENT)
Dept: GASTROENTEROLOGY | Age: 50
End: 2025-03-24

## 2025-03-24 VITALS
SYSTOLIC BLOOD PRESSURE: 107 MMHG | BODY MASS INDEX: 21.22 KG/M2 | WEIGHT: 140 LBS | HEART RATE: 70 BPM | DIASTOLIC BLOOD PRESSURE: 71 MMHG | RESPIRATION RATE: 20 BRPM | TEMPERATURE: 98.1 F | HEIGHT: 68 IN

## 2025-03-24 DIAGNOSIS — Z12.11 COLON CANCER SCREENING: ICD-10-CM

## 2025-03-24 DIAGNOSIS — Z12.11 COLON CANCER SCREENING: Primary | ICD-10-CM

## 2025-03-24 DIAGNOSIS — R10.9 ABDOMINAL PAIN, UNSPECIFIED ABDOMINAL LOCATION: Primary | ICD-10-CM

## 2025-03-24 PROCEDURE — G2211 COMPLEX E/M VISIT ADD ON: HCPCS | Performed by: INTERNAL MEDICINE

## 2025-03-24 PROCEDURE — 99204 OFFICE O/P NEW MOD 45 MIN: CPT | Performed by: INTERNAL MEDICINE

## 2025-03-24 RX ORDER — POLYETHYLENE GLYCOL 3350, SODIUM SULFATE ANHYDROUS, SODIUM BICARBONATE, SODIUM CHLORIDE, POTASSIUM CHLORIDE 236; 22.74; 6.74; 5.86; 2.97 G/4L; G/4L; G/4L; G/4L; G/4L
4 POWDER, FOR SOLUTION ORAL ONCE
Qty: 4000 ML | Refills: 0 | Status: SHIPPED | OUTPATIENT
Start: 2025-03-24 | End: 2025-03-24

## 2025-03-24 ASSESSMENT — ENCOUNTER SYMPTOMS
ABDOMINAL PAIN: 1
CHOKING: 0
TROUBLE SWALLOWING: 0
VOICE CHANGE: 0
ABDOMINAL DISTENTION: 1
NAUSEA: 1
COUGH: 0
ANAL BLEEDING: 0
DIARRHEA: 0
CONSTIPATION: 1
VOMITING: 0
COLOR CHANGE: 0
RECTAL PAIN: 0
WHEEZING: 0
SORE THROAT: 0
BLOOD IN STOOL: 0

## 2025-03-24 NOTE — PROGRESS NOTES
exhibits no distension and no mass. There is no tenderness. There is no rebound and no guarding. No hernia.   Musculoskeletal: Normal range of motion.   Lymphadenopathy:    Patient has no cervical adenopathy.   Neurological: Patient is alert and oriented to person, place, and time.   Psychiatric: Patient has a normal mood and affect. Patient behavior is normal.       LABORATORY DATA: Reviewed  Lab Results   Component Value Date    WBC 6.0 02/23/2025    HGB 16.1 02/23/2025    HCT 45.4 02/23/2025    MCV 87.5 02/23/2025     02/23/2025     02/23/2025    K 3.7 02/23/2025     02/23/2025    CO2 27 02/23/2025    BUN 19 02/23/2025    CREATININE 1.0 02/23/2025    BILITOT 0.6 02/23/2025    ALKPHOS 77 02/23/2025    AST 24 02/23/2025    ALT 29 02/23/2025    INR 1.0 11/21/2024         Lab Results   Component Value Date    RBC 5.19 02/23/2025    HGB 16.1 02/23/2025    MCV 87.5 02/23/2025    MCH 31.0 02/23/2025    MCHC 35.5 (H) 02/23/2025    RDW 11.4 (L) 02/23/2025    MPV 9.8 02/23/2025    BASOPCT 1 02/23/2025    LYMPHSABS 1.27 02/23/2025    MONOSABS 0.60 02/23/2025    NEUTROABS 3.95 02/23/2025    EOSABS 0.07 02/23/2025    BASOSABS 0.06 02/23/2025         IMPRESSION: Mr. Key is a 49 y.o. male with a past history remarkable for appendectomy, hernia repair, referred for evaluation of right lower quadrant abdominal pain.     Assessment  1. Abdominal pain, unspecified abdominal location    2. Colon cancer screening      Plan:  -Will plan for EGD (obtain distal esophagus, stomach and duodenal biopsies) and colonoscopy (obtain random colon biopsies and attempt to intubate the terminal ileum)  -Obtain ultrasound gallbladder to rule out gallstones    Luis M was seen today for follow-up from hospital and new patient.    Diagnoses and all orders for this visit:    Abdominal pain, unspecified abdominal location  -     US GALLBLADDER RUQ; Future  -     EGD; Future    Colon cancer screening  -     COLONOSCOPY

## 2025-03-24 NOTE — TELEPHONE ENCOUNTER
Pt saw Dr. Richards today in clinic. EGD/Colonoscopy ordered. Pt states he does not take blood thinners or GLP-1 medications, no cardiac hx. Pt said he would like procedure as soon as possible since he is currently not working.     Procedure scheduled/Dr Richards  Procedure: EGD/Colonoscopy (Screening)  Dx:  Abdominal pain, unspecified abdominal location; Colon cancer screening  Date: Thursday 04/03/25  Time: 9:45 am/Arrive at 7:45 am  Hospital: [facility]; Surgery Entrance, back of hospital  PAT Phone Call: Wednesday 03/26/25 at 12:30 pm  Bowel Prep instructions given: EGD Prep/Golytely Bowel Prep  In office/via phone: in office  Clearance needed: N/A  GLP-1: N/A    Pt informed they will receive a PAT Phone Call from a [facility] PAT Nurse 1-2 weeks prior to procedure. Pt informed they must complete PAT Call or procedure may be cancelled.     Pt informed it is required they must have a /responsible adult that takes them to their procedure, stays at the hospital (before, during, and after procedure), and drives pt home. Pt informed /responsible adult must stay inside the hospital during their procedure. Pt voiced understanding of  protocol for procedure.

## 2025-03-27 ENCOUNTER — HOSPITAL ENCOUNTER (OUTPATIENT)
Dept: PREADMISSION TESTING | Age: 50
Discharge: HOME | End: 2025-03-31

## 2025-03-27 VITALS — BODY MASS INDEX: 22.29 KG/M2 | HEIGHT: 67 IN | WEIGHT: 142 LBS

## 2025-03-27 NOTE — PROGRESS NOTES
Pre-op Instructions For Out-Patient Endoscopy Surgery    Medication Instructions:  Please stop herbs and any supplements now (includes vitamins and minerals).    For these medications:  Dulaglutide (Trulicity), Exenatide (Byetta and Bydureon, Liraglutide (Victoza), Lixisenatide (Adlyxin), Semaglutide (Ozempic and Rybelsus), Tirzepatide (Mounjaro, Zepbound)- Stop 1 week prior if taking weekly or 1 day prior if taking every 12 hours or daily. N/A    Please contact your surgeon and prescribing physician for pre-op instructions for any blood thinners.    If you have inhalers/aerosol treatments at home, please use them the morning of your surgery and bring the inhalers with you to the hospital.    Please take the following medications the morning of your surgery with a sip of water:    None     Surgery Instructions:  After midnight before surgery:  Do not eat or drink anything, including water, mints, gum, and hard candy.  You may brush your teeth without swallowing.  No smoking, chewing tobacco, or street drugs.    ** Please Follow Bowel Prep instructions if given by surgeon's office**    Please shower or bathe before surgery.       Please do not wear any cologne, lotion, powder, jewelry, piercings, perfume, makeup, nail polish, hair accessories, or hair spray on the day of surgery.  Wear loose comfortable clothing.    Leave your valuables at home but bring a payment source for any after-surgery prescriptions you plan to fill at [facility].  Bring a storage case for any glasses/contacts.    An adult who is responsible for you MUST drive you home and should be with you for the first 24 hours after surgery.     The Day of Surgery:  Arrive at [facility] Surgery Entrance at the time directed by your surgeon and check in at the desk. 4/3/25 @ 7:45 am    If you have a living will or healthcare power of , please bring a copy.    You will be taken to the pre-op holding area where you will be

## 2025-04-04 ENCOUNTER — PREP FOR PROCEDURE (OUTPATIENT)
Dept: GASTROENTEROLOGY | Age: 50
End: 2025-04-04

## 2025-04-07 ENCOUNTER — HOSPITAL ENCOUNTER (OUTPATIENT)
Dept: ULTRASOUND IMAGING | Age: 50
Discharge: HOME | End: 2025-04-09
Attending: INTERNAL MEDICINE
Payer: MEDICAID

## 2025-04-07 DIAGNOSIS — R10.9 ABDOMINAL PAIN, UNSPECIFIED ABDOMINAL LOCATION: ICD-10-CM

## 2025-04-07 PROCEDURE — 76705 ECHO EXAM OF ABDOMEN: CPT

## 2025-04-08 ENCOUNTER — RESULTS FOLLOW-UP (OUTPATIENT)
Dept: GASTROENTEROLOGY | Age: 50
End: 2025-04-08

## 2025-04-16 ENCOUNTER — PATIENT MESSAGE (OUTPATIENT)
Dept: GASTROENTEROLOGY | Age: 50
End: 2025-04-16

## 2025-04-22 RX ORDER — POLYETHYLENE GLYCOL 3350, SODIUM SULFATE ANHYDROUS, SODIUM BICARBONATE, SODIUM CHLORIDE, POTASSIUM CHLORIDE 236; 22.74; 6.74; 5.86; 2.97 G/4L; G/4L; G/4L; G/4L; G/4L
POWDER, FOR SOLUTION ORAL
COMMUNITY
Start: 2025-03-24

## 2025-04-23 PROBLEM — Z12.11 COLON CANCER SCREENING: Status: RESOLVED | Noted: 2025-03-24 | Resolved: 2025-04-23

## 2025-04-24 ENCOUNTER — HOSPITAL ENCOUNTER (OUTPATIENT)
Dept: PREADMISSION TESTING | Age: 50
Discharge: HOME | End: 2025-04-28

## 2025-04-24 VITALS — HEIGHT: 67 IN | BODY MASS INDEX: 22.76 KG/M2 | WEIGHT: 145 LBS

## 2025-04-24 NOTE — PROGRESS NOTES
physical assessment will be performed by a nurse practitioner or house officer.  Your IV will be started and you will meet your anesthesiologist.    When you go to surgery, your family will be directed to the surgical waiting room, where the doctor should speak with them after your surgery.    After surgery, you will be taken to the recovery area.  When you are alert and stable, you will receive instructions and be prepared for discharge.       INSTRUCTIONS REVIEWED WITH MIRNA. VERBALIZED UNDERSTANDING.   EGD & COLONOSCOPY WITH MATTHEW 5/8/25 @ 0800.  PT DOES NOT HAVE A  AT THIS TIME. EMPHASIZED NEEDED A KATELYNN. PT VERBALIZED UNDERSTANDING.

## 2025-05-07 ENCOUNTER — PREP FOR PROCEDURE (OUTPATIENT)
Dept: GASTROENTEROLOGY | Age: 50
End: 2025-05-07

## 2025-05-07 DIAGNOSIS — Z12.11 COLON CANCER SCREENING: ICD-10-CM

## 2025-06-06 PROBLEM — Z12.11 COLON CANCER SCREENING: Status: RESOLVED | Noted: 2025-05-07 | Resolved: 2025-06-06

## 2025-08-13 ENCOUNTER — HOSPITAL ENCOUNTER (OUTPATIENT)
Age: 50
Setting detail: SPECIMEN
Discharge: HOME | End: 2025-08-13

## 2025-08-13 ENCOUNTER — TELEPHONE (OUTPATIENT)
Age: 50
End: 2025-08-13

## 2025-08-13 ENCOUNTER — OFFICE VISIT (OUTPATIENT)
Age: 50
End: 2025-08-13
Payer: MEDICAID

## 2025-08-13 VITALS
WEIGHT: 141.8 LBS | DIASTOLIC BLOOD PRESSURE: 69 MMHG | BODY MASS INDEX: 22.79 KG/M2 | OXYGEN SATURATION: 98 % | HEART RATE: 69 BPM | TEMPERATURE: 97.3 F | SYSTOLIC BLOOD PRESSURE: 104 MMHG | HEIGHT: 66 IN

## 2025-08-13 DIAGNOSIS — R10.31 RIGHT INGUINAL PAIN: ICD-10-CM

## 2025-08-13 DIAGNOSIS — R10.9 ABDOMINAL PAIN, UNSPECIFIED ABDOMINAL LOCATION: ICD-10-CM

## 2025-08-13 DIAGNOSIS — Z13.1 DIABETES MELLITUS SCREENING: ICD-10-CM

## 2025-08-13 DIAGNOSIS — R10.31 RIGHT INGUINAL PAIN: Primary | ICD-10-CM

## 2025-08-13 LAB
CHOLEST SERPL-MCNC: 192 MG/DL (ref 0–199)
CHOLESTEROL/HDL RATIO: 5.1
HBA1C MFR BLD: 5.5 %
HBV SURFACE AG SERPL QL IA: NONREACTIVE
HCV AB SERPL QL IA: NONREACTIVE
HDLC SERPL-MCNC: 38 MG/DL
HIV 1+2 AB+HIV1 P24 AG SERPL QL IA: NONREACTIVE
LDLC SERPL CALC-MCNC: 109 MG/DL (ref 0–100)
PSA SERPL-MCNC: 0.98 NG/ML (ref 0–4)
T PALLIDUM AB SER QL IA: NONREACTIVE
TRIGL SERPL-MCNC: 223 MG/DL
VLDLC SERPL CALC-MCNC: 45 MG/DL (ref 1–30)

## 2025-08-13 PROCEDURE — 83036 HEMOGLOBIN GLYCOSYLATED A1C: CPT

## 2025-08-13 PROCEDURE — 99213 OFFICE O/P EST LOW 20 MIN: CPT

## 2025-08-13 RX ORDER — ACETAMINOPHEN 500 MG
500 TABLET ORAL EVERY 6 HOURS PRN
Qty: 120 TABLET | Refills: 0 | Status: SHIPPED | OUTPATIENT
Start: 2025-08-13

## 2025-08-14 LAB
CHLAMYDIA DNA UR QL NAA+PROBE: NEGATIVE
N GONORRHOEA DNA UR QL NAA+PROBE: NEGATIVE
SPECIMEN DESCRIPTION: NORMAL

## 2025-08-15 DIAGNOSIS — E78.5 HYPERLIPIDEMIA, UNSPECIFIED HYPERLIPIDEMIA TYPE: Primary | ICD-10-CM

## 2025-08-15 RX ORDER — ATORVASTATIN CALCIUM 10 MG/1
10 TABLET, FILM COATED ORAL DAILY
Qty: 30 TABLET | Refills: 3 | Status: SHIPPED | OUTPATIENT
Start: 2025-08-15

## (undated) DEVICE — SYRINGE MED 10ML SLIP TIP BLNT FILL AND LUERLOCK DISP

## (undated) DEVICE — ELECTRODE PT RET AD L9FT HI MOIST COND ADH HYDRGEL CORDED

## (undated) DEVICE — NEEDLE SPNL 18GA L3.5IN W/ QNCKE SHARPER BVL DURA CLICK

## (undated) DEVICE — APPLICATOR MEDICATED 26 CC SOLUTION HI LT ORNG CHLORAPREP

## (undated) DEVICE — INSUFFLATION TUBING SET WITH FILTER, FUNNEL CONNECTOR AND LUER LOCK: Brand: JOSNOE MEDICAL INC

## (undated) DEVICE — SUTURE MCRYL + SZ 4-0 L18IN ABSRB UD L19MM PS-2 3/8 CIR MCP496G

## (undated) DEVICE — Device

## (undated) DEVICE — PLUMEPORT ACTIV LAPAROSCOPIC SMOKE FILTRATION DEVICE: Brand: PLUMEPORT ACTIVE

## (undated) DEVICE — COVER,LIGHT HANDLE,FLX,2/PK: Brand: MEDLINE INDUSTRIES, INC.

## (undated) DEVICE — BLANKET WRM W29.9XL79.1IN UP BODY FORC AIR MISTRAL-AIR

## (undated) DEVICE — SUTURE ABSORBABLE MONOFILAMENT 2-0 GS-22 9 IN UD V-LOC 90 VLOCM2245

## (undated) DEVICE — PREMIUM DRY TRAY LF: Brand: MEDLINE INDUSTRIES, INC.

## (undated) DEVICE — TIP COVER ACCESSORY

## (undated) DEVICE — LIQUIBAND RAPID ADHESIVE 36/CS 0.8ML: Brand: MEDLINE

## (undated) DEVICE — ARM DRAPE

## (undated) DEVICE — GLOVE ORANGE PI 7 1/2   MSG9075

## (undated) DEVICE — INSUFFLATION NEEDLE TO ESTABLISH PNEUMOPERITONEUM.: Brand: INSUFFLATION NEEDLE

## (undated) DEVICE — STERILE POLYISOPRENE POWDER-FREE SURGICAL GLOVES WITH EMOLLIENT COATING: Brand: PROTEXIS

## (undated) DEVICE — PAD PT POS 36 IN SURGYPAD DISP

## (undated) DEVICE — SEAL

## (undated) DEVICE — SOLUTION ANTIFOG VIS SYS CLEARIFY LAPSCP

## (undated) DEVICE — BLADE CLIPPER GEN PURP NS

## (undated) DEVICE — BLADELESS OBTURATOR: Brand: WECK VISTA

## (undated) DEVICE — TOTAL TRAY, 16FR 10ML SIL FOLEY, URN: Brand: MEDLINE

## (undated) DEVICE — SOLUTION SCRB 4OZ 4% CHG H2O AIDED FOR PREOPERATIVE SKIN

## (undated) DEVICE — SOLUTION IRRIG 1000ML 0.9% SOD CHL USP POUR PLAS BTL